# Patient Record
Sex: FEMALE | RURAL
[De-identification: names, ages, dates, MRNs, and addresses within clinical notes are randomized per-mention and may not be internally consistent; named-entity substitution may affect disease eponyms.]

---

## 2019-11-12 ENCOUNTER — HISTORICAL (OUTPATIENT)
Dept: ADMINISTRATIVE | Facility: HOSPITAL | Age: 24
End: 2019-11-12

## 2019-11-14 LAB
LAB AP CLINICAL INFORMATION: NORMAL
LAB AP DIAGNOSIS - HISTORICAL: NORMAL
LAB AP GROSS PATHOLOGY - HISTORICAL: NORMAL
LAB AP SPECIMEN SUBMITTED - HISTORICAL: NORMAL

## 2024-07-25 ENCOUNTER — OFFICE VISIT (OUTPATIENT)
Dept: OBSTETRICS AND GYNECOLOGY | Facility: CLINIC | Age: 29
End: 2024-07-25
Payer: COMMERCIAL

## 2024-07-25 VITALS
HEIGHT: 64 IN | SYSTOLIC BLOOD PRESSURE: 131 MMHG | WEIGHT: 194 LBS | DIASTOLIC BLOOD PRESSURE: 79 MMHG | HEART RATE: 73 BPM | TEMPERATURE: 98 F | RESPIRATION RATE: 18 BRPM | BODY MASS INDEX: 33.12 KG/M2 | OXYGEN SATURATION: 98 %

## 2024-07-25 DIAGNOSIS — N91.1 SECONDARY AMENORRHEA: Primary | ICD-10-CM

## 2024-07-25 PROCEDURE — 99204 OFFICE O/P NEW MOD 45 MIN: CPT | Mod: ,,, | Performed by: ADVANCED PRACTICE MIDWIFE

## 2024-07-25 PROCEDURE — 3008F BODY MASS INDEX DOCD: CPT | Mod: ,,, | Performed by: ADVANCED PRACTICE MIDWIFE

## 2024-07-25 PROCEDURE — 3075F SYST BP GE 130 - 139MM HG: CPT | Mod: ,,, | Performed by: ADVANCED PRACTICE MIDWIFE

## 2024-07-25 PROCEDURE — 3078F DIAST BP <80 MM HG: CPT | Mod: ,,, | Performed by: ADVANCED PRACTICE MIDWIFE

## 2024-07-25 PROCEDURE — 1159F MED LIST DOCD IN RCRD: CPT | Mod: ,,, | Performed by: ADVANCED PRACTICE MIDWIFE

## 2024-07-25 NOTE — PROGRESS NOTES
Missed Menses/ Possible Pregnancy  Patient complains of amenorrhea. She believes she could be pregnant. Pregnancy is desired. Sexual Activity: has sex with males. Current symptoms also include: breast tenderness, frequent urination, nausea, and positive home pregnancy test. Last period was normal.     Patient's last menstrual period was 2024.     Review of patient's allergies indicates:  No Known Allergies    History reviewed. No pertinent past medical history.  Past Surgical History:   Procedure Laterality Date    MOUTH SURGERY       OB History          2    Para   1    Term   1            AB        Living   1         SAB        IAB        Ectopic        Multiple        Live Births   1               Family History   Problem Relation Name Age of Onset    Diabetes Maternal Grandmother      Hypertension Father      Ovarian cancer Mother      Ovarian cysts Mother       Social History     Tobacco Use    Smoking status: Never     Passive exposure: Never    Smokeless tobacco: Never   Substance Use Topics    Alcohol use: Never     Genetic History reviewed    Current Outpatient Medications   Medication Sig    PNV no.95/ferrous fum/folic ac (PRENATAL ORAL) Take by mouth.     No current facility-administered medications for this visit.       OB History    Para Term  AB Living   2 1 1     1   SAB IAB Ectopic Multiple Live Births           1      # Outcome Date GA Lbr Bob/2nd Weight Sex Type Anes PTL Lv   2 Current            1 Term 19 40w0d  3.09 kg (6 lb 13 oz) M Vag-Spont EPI N CANDELARIO        Review of Systems  ROS:  GENERAL: No fever, chills, fatigability or weight loss.  VULVAR: No pain, no lesions and no itching.  VAGINAL: No relaxation, no itching, no discharge, no abnormal bleeding and no lesions.  ABDOMEN: No abdominal pain. Denies nausea. Denies vomiting. No diarrhea. No constipation  BREAST: Denies pain. No lumps. No discharge.  URINARY: No incontinence, no nocturia, no frequency  and no dysuria.  CARDIOVASCULAR: No chest pain. No shortness of breath. No leg cramps.  NEUROLOGICAL: no headaches. No vision changes.    Objective:     PE:  AFFECT: Calm, alert and oriented X 3. Interactive during exam  GENERAL: Appears well-nourished, well-developed, in no acute distress.  HEAD: Normocephalic, atruamatic  TEETH: Good dentition.  THYROID: No thyromegally   BREASTS: No masses, skin changes, nipple discharge or adenopathy bilaterally.  SKIN: Normal for race, warm, & dry. No lesions or rashes.  LUNGS: Easy and unlabored, clear to auscultation bilaterally.  HEART: Regular rate and rhythm   EXTREMITIES: No cyanosis, clubbing or edema. No calf tenderness.    Pregnancy test: positive  EDC: 3/3/2025     Assessment:     Emilie was seen today for confirm pregnancy.    Diagnoses and all orders for this visit:    Secondary amenorrhea  -     POCT urine pregnancy    BMI 33.0-33.9,adult        ICD-10-CM ICD-9-CM    1. Secondary amenorrhea  N91.1 626.0 POCT urine pregnancy      2. BMI 33.0-33.9,adult  Z68.33 V85.33            Plan:     Oriented to practice  Bleeding precautions discussed. If noted, follow up a the emergency room or clinic if scheduled for evaluation.  Counseled to avoid cat litter boxes.  Avoid gardening without gloves  Avoid half cooked, rare, or raw meats.  Avoid foods high in nitrites such as cold cuts- heat up any deli meats.  Seafood no more than 3 times per week or may eat large fish like tuna no more than once a week.  Avoid soft unpasteurized cheeses.  I recommend a PNV daily.    She should avoid ibuprofen, aleve, advil, BC powders.  OB bag with prenatal book and information provided  Weight gain in pregnancy and limit to 15 pounds in pregnancy discussed  Vitamin D daily  Prenatal vitamins daily  Verbalized understanding to all instructions and information  Questions answered to desired level of satisfaction      Follow up in about 1 week (around 8/1/2024), or if symptoms worsen or fail  to improve, for OB Annual Exam 1 wk with an ultrasound and f/u in 2 wks for NOB visit.    Lucia Hernandez, DNP, CNM, WHNP-BC

## 2024-07-25 NOTE — LETTER
July 25, 2024    Emilie Petty  [address] [place] [address] [place] [address]             [facility] - [facility]/GYN  [address] [place] [address]  Phone: [phone]  Fax: [phone] 07/25/2024     Emilie Petty   1995       Emilie Petty is currently pregnant and her due date is Estimated Date of Delivery: 3/3/25.    Sincerely,          Lucia Hernandez, MARGOT, CNM, WHNP-BC  Doctor of Nursing Practice, Certified Nurse Midwife, Women's Health Nurse Practitioner

## 2024-07-25 NOTE — LETTER
July 25, 2024    Emilie Petty  [address] [place] [address] [place] [address]             [facility] - OB/[facility]N  [address] [place] [address]  Phone: [phone]  Fax: [phone] 07/25/2024     Emiliemarcella Petty   1995       Emilie Petty is currently pregnant and her due date is Estimated Date of Delivery: 3/9/25.    Sincerely,          Dee Dee Powell LPN for Lucia Hernandez, DNP, CNM, NP-BC  Doctor of Nursing Practice, Certified Nurse Midwife, Women's Health Nurse Practitioner

## 2024-07-31 ENCOUNTER — OFFICE VISIT (OUTPATIENT)
Dept: OBSTETRICS AND GYNECOLOGY | Facility: CLINIC | Age: 29
End: 2024-07-31
Payer: COMMERCIAL

## 2024-07-31 ENCOUNTER — PROCEDURE VISIT (OUTPATIENT)
Dept: OBSTETRICS AND GYNECOLOGY | Facility: CLINIC | Age: 29
End: 2024-07-31
Payer: COMMERCIAL

## 2024-07-31 VITALS
DIASTOLIC BLOOD PRESSURE: 80 MMHG | WEIGHT: 191 LBS | RESPIRATION RATE: 18 BRPM | SYSTOLIC BLOOD PRESSURE: 129 MMHG | OXYGEN SATURATION: 99 % | HEART RATE: 79 BPM | TEMPERATURE: 98 F | BODY MASS INDEX: 32.61 KG/M2 | HEIGHT: 64 IN

## 2024-07-31 DIAGNOSIS — Z11.3 SCREEN FOR SEXUALLY TRANSMITTED DISEASES: ICD-10-CM

## 2024-07-31 DIAGNOSIS — Z01.419 WELL WOMAN EXAM WITH ROUTINE GYNECOLOGICAL EXAM: Primary | ICD-10-CM

## 2024-07-31 DIAGNOSIS — Z72.51 RISK FOR SEXUALLY TRANSMITTED DISEASE: ICD-10-CM

## 2024-07-31 DIAGNOSIS — Z12.4 ENCOUNTER FOR SCREENING FOR MALIGNANT NEOPLASM OF CERVIX: ICD-10-CM

## 2024-07-31 DIAGNOSIS — N91.1 SECONDARY AMENORRHEA: Primary | ICD-10-CM

## 2024-07-31 LAB
CANDIDA SPECIES: NEGATIVE
GARDNERELLA: POSITIVE
TRICHOMONAS: NEGATIVE

## 2024-07-31 PROCEDURE — 1159F MED LIST DOCD IN RCRD: CPT | Mod: ,,, | Performed by: ADVANCED PRACTICE MIDWIFE

## 2024-07-31 PROCEDURE — 3074F SYST BP LT 130 MM HG: CPT | Mod: ,,, | Performed by: ADVANCED PRACTICE MIDWIFE

## 2024-07-31 PROCEDURE — 3008F BODY MASS INDEX DOCD: CPT | Mod: ,,, | Performed by: ADVANCED PRACTICE MIDWIFE

## 2024-07-31 PROCEDURE — 87591 N.GONORRHOEAE DNA AMP PROB: CPT | Mod: ,,, | Performed by: CLINICAL MEDICAL LABORATORY

## 2024-07-31 PROCEDURE — 87624 HPV HI-RISK TYP POOLED RSLT: CPT | Mod: ,,, | Performed by: CLINICAL MEDICAL LABORATORY

## 2024-07-31 PROCEDURE — 87480 CANDIDA DNA DIR PROBE: CPT | Mod: ,,, | Performed by: CLINICAL MEDICAL LABORATORY

## 2024-07-31 PROCEDURE — 99402 PREV MED CNSL INDIV APPRX 30: CPT | Mod: ,,, | Performed by: ADVANCED PRACTICE MIDWIFE

## 2024-07-31 PROCEDURE — 76801 OB US < 14 WKS SINGLE FETUS: CPT | Mod: ,,, | Performed by: OBSTETRICS & GYNECOLOGY

## 2024-07-31 PROCEDURE — 87491 CHLMYD TRACH DNA AMP PROBE: CPT | Mod: ,,, | Performed by: CLINICAL MEDICAL LABORATORY

## 2024-07-31 PROCEDURE — 88142 CYTOPATH C/V THIN LAYER: CPT | Mod: TC,GCY | Performed by: ADVANCED PRACTICE MIDWIFE

## 2024-07-31 PROCEDURE — 87510 GARDNER VAG DNA DIR PROBE: CPT | Mod: ,,, | Performed by: CLINICAL MEDICAL LABORATORY

## 2024-07-31 PROCEDURE — 87660 TRICHOMONAS VAGIN DIR PROBE: CPT | Mod: ,,, | Performed by: CLINICAL MEDICAL LABORATORY

## 2024-07-31 PROCEDURE — 3079F DIAST BP 80-89 MM HG: CPT | Mod: ,,, | Performed by: ADVANCED PRACTICE MIDWIFE

## 2024-07-31 PROCEDURE — 99499 UNLISTED E&M SERVICE: CPT | Mod: ,,, | Performed by: OBSTETRICS & GYNECOLOGY

## 2024-07-31 NOTE — PROGRESS NOTES
"CC: Here for pap smear, annual exam    Emilie Petty is a 29 y.o. female  presents for well woman exam.  LMP: Patient's last menstrual period was 2024 (exact date).. Menses are: normal. Denies any further issues, problems, or complaints.    Last mammogram: n/a  Colonoscopy: n/a    History reviewed. No pertinent past medical history.  Past Surgical History:   Procedure Laterality Date    MOUTH SURGERY       Social History     Socioeconomic History    Marital status: Single   Tobacco Use    Smoking status: Never     Passive exposure: Never    Smokeless tobacco: Never   Substance and Sexual Activity    Alcohol use: Never    Sexual activity: Yes     Partners: Male     Family History   Problem Relation Name Age of Onset    Diabetes Maternal Grandmother      Hypertension Father      Ovarian cancer Mother      Ovarian cysts Mother       OB History          2    Para   1    Term   1            AB        Living   1         SAB        IAB        Ectopic        Multiple        Live Births   1                 /80   Pulse 79   Temp 98.3 °F (36.8 °C) (Oral)   Resp 18   Ht 5' 4" (1.626 m)   Wt 86.6 kg (191 lb)   LMP 2024 (Exact Date)   SpO2 99%   BMI 32.79 kg/m²       ROS:  GENERAL: Denies weight gain or weight loss. Feeling well overall.   SKIN: Denies rash or lesions.   HEAD: Denies head injury or headache.   NODES: Denies enlarged lymph nodes.   CHEST: Denies chest pain or shortness of breath.   CARDIOVASCULAR: Denies palpitations or left sided chest pain.   ABDOMEN: No abdominal pain, constipation, diarrhea, nausea, vomiting or rectal bleeding.   URINARY: No frequency, dysuria, hematuria, or burning on urination.  REPRODUCTIVE: See HPI.   BREASTS: The patient performs breast self-examination and denies pain, lumps, or nipple discharge.   HEMATOLOGIC: No easy bruisability or excessive bleeding.   MUSCULOSKELETAL: Denies joint pain or swelling.   NEUROLOGIC: Denies syncope or " weakness.   PSYCHIATRIC: Denies depression, anxiety or mood swings.    PHYSICAL EXAM:  APPEARANCE: Well nourished, well developed, in no acute distress.  AFFECT: WNL, alert and oriented x 3  SKIN: No acne or hirsutism  NECK: Neck symmetric without masses or thyromegaly  NODES: No inguinal, cervical, axillary, or femoral lymph node enlargement  CHEST: Good respiratory effect  ABDOMEN: Soft.  No tenderness or masses.  No hepatosplenomegaly.  No hernias.  BREASTS: Symmetrical, no skin changes or visible lesions.  No palpable masses, nipple discharge bilaterally.  PELVIC: Normal external genitalia without lesions.  Normal hair distribution.  Adequate perineal body, normal urethral meatus.  Vagina moist and well rugated with thin white  discharge.  Cervix pink, without lesions, tenderness with thin white discharge.  No significant cystocele or rectocele.  Bimanual exam shows uterus to be normal size, regular, mobile and nontender.  Adnexa without masses or tenderness.    EXTREMITIES: No edema.    Well woman exam with routine gynecological exam  -     ThinPrep Pap Test; Future; Expected date: 07/31/2024    Encounter for screening for malignant neoplasm of cervix  -     ThinPrep Pap Test; Future; Expected date: 07/31/2024    Screen for sexually transmitted diseases  -     Chlamydia/GC, PCR; Future; Expected date: 07/31/2024  -     Bacterial Vaginosis; Future; Expected date: 07/31/2024    Risk for sexually transmitted disease  -     Chlamydia/GC, PCR; Future; Expected date: 07/31/2024  -     Bacterial Vaginosis; Future; Expected date: 07/31/2024    BMI 32.0-32.9,adult        ICD-10-CM ICD-9-CM    1. Well woman exam with routine gynecological exam  Z01.419 V72.31 ThinPrep Pap Test      2. Encounter for screening for malignant neoplasm of cervix  Z12.4 V76.2 ThinPrep Pap Test      3. Screen for sexually transmitted diseases  Z11.3 V74.5 Chlamydia/GC, PCR      Bacterial Vaginosis      4. Risk for sexually transmitted disease   Z72.51 V69.2 Chlamydia/GC, PCR      Bacterial Vaginosis      5. BMI 32.0-32.9,adult  Z68.32 V85.32           Patient was counseled today on A.C.S. Pap guidelines and recommendations for yearly pelvic exams, mammograms and monthly self breast exams; to see her PCP for other health maintenance.   Exercise regimen encouraged  Healthy food choices encouraged  Multivitamins daily  Vitamin D daily  Questions answered to desired level of satisfaction  Verbalized understanding to all information and instructions    Follow up in about 1 year (around 7/31/2025), or if symptoms worsen or fail to improve, for annual exam.

## 2024-08-01 LAB
CHLAMYDIA BY PCR: NEGATIVE
N. GONORRHOEAE (GC) BY PCR: NEGATIVE

## 2024-08-02 LAB
GH SERPL-MCNC: NORMAL NG/ML
INSULIN SERPL-ACNC: NORMAL U[IU]/ML
LAB AP CLINICAL INFORMATION: NORMAL
LAB AP GYN INTERPRETATION: NEGATIVE
LAB AP PAP DISCLAIMER COMMENTS: NORMAL
RENIN PLAS-CCNC: NORMAL NG/ML/H

## 2024-08-05 DIAGNOSIS — N76.0 BV (BACTERIAL VAGINOSIS): Primary | ICD-10-CM

## 2024-08-05 DIAGNOSIS — B96.89 BV (BACTERIAL VAGINOSIS): Primary | ICD-10-CM

## 2024-08-05 RX ORDER — METRONIDAZOLE 500 MG/1
500 TABLET ORAL 2 TIMES DAILY
Qty: 14 TABLET | Refills: 0 | Status: SHIPPED | OUTPATIENT
Start: 2024-08-05 | End: 2024-08-12

## 2024-08-08 ENCOUNTER — ROUTINE PRENATAL (OUTPATIENT)
Dept: OBSTETRICS AND GYNECOLOGY | Facility: CLINIC | Age: 29
End: 2024-08-08
Payer: COMMERCIAL

## 2024-08-08 VITALS
BODY MASS INDEX: 32.68 KG/M2 | SYSTOLIC BLOOD PRESSURE: 121 MMHG | DIASTOLIC BLOOD PRESSURE: 77 MMHG | HEART RATE: 87 BPM | WEIGHT: 190.38 LBS

## 2024-08-08 DIAGNOSIS — Z36.89 ENCOUNTER FOR OTHER SPECIFIED ANTENATAL SCREENING: ICD-10-CM

## 2024-08-08 DIAGNOSIS — Z11.4 SCREENING FOR HIV (HUMAN IMMUNODEFICIENCY VIRUS): ICD-10-CM

## 2024-08-08 DIAGNOSIS — Z11.3 SCREEN FOR SEXUALLY TRANSMITTED DISEASES: ICD-10-CM

## 2024-08-08 DIAGNOSIS — Z34.80 ENCOUNTER FOR SUPERVISION OF OTHER NORMAL PREGNANCY, UNSPECIFIED TRIMESTER: Primary | ICD-10-CM

## 2024-08-08 DIAGNOSIS — Z3A.10 10 WEEKS GESTATION OF PREGNANCY: ICD-10-CM

## 2024-08-08 DIAGNOSIS — E55.9 VITAMIN D DEFICIENCY, UNSPECIFIED: ICD-10-CM

## 2024-08-08 DIAGNOSIS — Z72.51 HIGH RISK HETEROSEXUAL BEHAVIOR: ICD-10-CM

## 2024-08-08 LAB
25(OH)D3 SERPL-MCNC: 37.2 NG/ML
BASOPHILS # BLD AUTO: 0.03 K/UL (ref 0–0.2)
BASOPHILS NFR BLD AUTO: 0.5 % (ref 0–1)
BILIRUB SERPL-MCNC: NORMAL MG/DL
BLOOD, POC UA: NORMAL
DIFFERENTIAL METHOD BLD: ABNORMAL
EOSINOPHIL # BLD AUTO: 0.07 K/UL (ref 0–0.5)
EOSINOPHIL NFR BLD AUTO: 1.1 % (ref 1–4)
ERYTHROCYTE [DISTWIDTH] IN BLOOD BY AUTOMATED COUNT: 14.8 % (ref 11.5–14.5)
GLUCOSE UR QL STRIP: NORMAL
HBV SURFACE AG SERPL QL IA: NORMAL
HCT VFR BLD AUTO: 36.9 % (ref 38–47)
HGB BLD-MCNC: 11.4 G/DL (ref 12–16)
HIV 1+O+2 AB SERPL QL: NORMAL
IMM GRANULOCYTES # BLD AUTO: 0.02 K/UL (ref 0–0.04)
IMM GRANULOCYTES NFR BLD: 0.3 % (ref 0–0.4)
KETONES UR QL STRIP: NORMAL
LEUKOCYTE ESTERASE URINE, POC: NORMAL
LYMPHOCYTES # BLD AUTO: 2.13 K/UL (ref 1–4.8)
LYMPHOCYTES NFR BLD AUTO: 32.1 % (ref 27–41)
MCH RBC QN AUTO: 24.9 PG (ref 27–31)
MCHC RBC AUTO-ENTMCNC: 30.9 G/DL (ref 32–36)
MCV RBC AUTO: 80.6 FL (ref 80–96)
MONOCYTES # BLD AUTO: 0.7 K/UL (ref 0–0.8)
MONOCYTES NFR BLD AUTO: 10.6 % (ref 2–6)
MPC BLD CALC-MCNC: 11.2 FL (ref 9.4–12.4)
NEUTROPHILS # BLD AUTO: 3.68 K/UL (ref 1.8–7.7)
NEUTROPHILS NFR BLD AUTO: 55.4 % (ref 53–65)
NITRITE, POC UA: NORMAL
NRBC # BLD AUTO: 0 X10E3/UL
NRBC, AUTO (.00): 0 %
PH, POC UA: 7
PLATELET # BLD AUTO: 271 K/UL (ref 150–400)
PROTEIN, POC: NORMAL
RBC # BLD AUTO: 4.58 M/UL (ref 4.2–5.4)
RUBV IGG SER-ACNC: NORMAL [IU]/ML
SPECIFIC GRAVITY, POC UA: >=1.03
SYPHILIS AB INTERPRETATION: NORMAL
UROBILINOGEN, POC UA: 1
WBC # BLD AUTO: 6.63 K/UL (ref 4.5–11)

## 2024-08-08 PROCEDURE — 36415 COLL VENOUS BLD VENIPUNCTURE: CPT | Mod: ,,, | Performed by: ADVANCED PRACTICE MIDWIFE

## 2024-08-08 PROCEDURE — 85025 COMPLETE CBC W/AUTO DIFF WBC: CPT | Mod: ,,, | Performed by: CLINICAL MEDICAL LABORATORY

## 2024-08-08 PROCEDURE — 87340 HEPATITIS B SURFACE AG IA: CPT | Mod: ,,, | Performed by: CLINICAL MEDICAL LABORATORY

## 2024-08-08 PROCEDURE — 87389 HIV-1 AG W/HIV-1&-2 AB AG IA: CPT | Mod: ,,, | Performed by: CLINICAL MEDICAL LABORATORY

## 2024-08-08 PROCEDURE — 87086 URINE CULTURE/COLONY COUNT: CPT | Mod: ,,, | Performed by: CLINICAL MEDICAL LABORATORY

## 2024-08-08 PROCEDURE — 83036 HEMOGLOBIN GLYCOSYLATED A1C: CPT | Mod: ,,, | Performed by: CLINICAL MEDICAL LABORATORY

## 2024-08-08 PROCEDURE — 82306 VITAMIN D 25 HYDROXY: CPT | Mod: ,,, | Performed by: CLINICAL MEDICAL LABORATORY

## 2024-08-08 PROCEDURE — 86762 RUBELLA ANTIBODY: CPT | Mod: ,,, | Performed by: CLINICAL MEDICAL LABORATORY

## 2024-08-08 PROCEDURE — 86780 TREPONEMA PALLIDUM: CPT | Mod: ,,, | Performed by: CLINICAL MEDICAL LABORATORY

## 2024-08-09 LAB
EST. AVERAGE GLUCOSE BLD GHB EST-MCNC: 108 MG/DL
HBA1C MFR BLD HPLC: 5.4 % (ref 4.5–6.6)
INDIRECT COOMBS: NORMAL
RH BLD: NORMAL
SPECIMEN OUTDATE: NORMAL

## 2024-08-10 LAB — UA COMPLETE W REFLEX CULTURE PNL UR: NO GROWTH

## 2024-08-15 ENCOUNTER — TELEPHONE (OUTPATIENT)
Dept: OBSTETRICS AND GYNECOLOGY | Facility: CLINIC | Age: 29
End: 2024-08-15
Payer: COMMERCIAL

## 2024-08-15 NOTE — TELEPHONE ENCOUNTER
Patient called the clinic crying and upset because she worked in another area and she was bending constantly all day and she was upset. She wants her [facility] paperwork to state that she covered for episodes. I asked what episode was she having and she said that she is having back pains and she needs to take off when she is having an episode. I'm unsure of what episode she is having but I never got a good understanding to what was. After explaining how I feel out the paper work and we a lot 3 days for prenatal visits a month. She still didn't understand. So, I told her when she come in for her next appointment talk to Lucia about it.

## 2024-08-28 NOTE — PROCEDURES
New OB Ultrasound note:      Uterus 11.78 x 7.78 x 7.68 cm    Crown-rump length 9 weeks 6 day  Fetal heart rate 161 beats per minute     Impression:    IUP with fetal heart tones   Estimated gestational age 9 weeks 6 day  Estimated delivery date February 27, 2025

## 2024-09-05 ENCOUNTER — ROUTINE PRENATAL (OUTPATIENT)
Dept: OBSTETRICS AND GYNECOLOGY | Facility: CLINIC | Age: 29
End: 2024-09-05
Payer: COMMERCIAL

## 2024-09-05 VITALS
BODY MASS INDEX: 32.58 KG/M2 | HEART RATE: 67 BPM | SYSTOLIC BLOOD PRESSURE: 133 MMHG | WEIGHT: 189.81 LBS | DIASTOLIC BLOOD PRESSURE: 77 MMHG

## 2024-09-05 DIAGNOSIS — Z3A.14 14 WEEKS GESTATION OF PREGNANCY: Primary | ICD-10-CM

## 2024-09-05 LAB
BILIRUB SERPL-MCNC: NEGATIVE MG/DL
BLOOD, POC UA: NEGATIVE
GLUCOSE UR QL STRIP: NEGATIVE
KETONES UR QL STRIP: NEGATIVE
LEUKOCYTE ESTERASE URINE, POC: NEGATIVE
NITRITE, POC UA: NEGATIVE
PH, POC UA: 7.5
PROTEIN, POC: NEGATIVE
SPECIFIC GRAVITY, POC UA: 1.02
UROBILINOGEN, POC UA: 1

## 2024-09-05 NOTE — PROGRESS NOTES
29 y.o. female  at 14w3d   She c/o sciatic nerve discomforts  Reports occasional fetal movement or fluttering. Denies any vaginal bleeding, leakage of fluid, cramping, contractions, or pressure.   Total weight gain/weight loss in pregnancy: -1.905 kg (-4 lb 3.2 oz)     Vitals  BP: 133/77  Pulse: 67  Weight: 86.1 kg (189 lb 12.8 oz)  Prenatal  Fundal Height (cm): 15 cm  Fetal Heart Rate: 161  Movement: Present  Urine Albumin/Glucose  Urine Albumin: Negative  Urine Glucose: Negative  Edema  LLE Edema: None  RLE Edema: None  Facial: None  Additional Edema?: No    Prenatal Labs:  Lab Results   Component Value Date    GROUPTRH A NEG 2024    HGB 11.4 (L) 2024    HCT 36.9 (L) 2024     2024    SICKLE Negative 2024    HEPBSAG Non-Reactive 2024    [id number] Non-Reactive 2024    LABNGO Negative 2024    LABURIN No Growth 2024       A: 14w3d           ICD-10-CM ICD-9-CM    1. 14 weeks gestation of pregnancy  Z3A.14 V22.2 POCT URINALYSIS          P: Bleeding, daily fetal kick counts, and  labor/labor precautions discussed.    The following were addressed during this visit:    13-16 Weeks  - Quad screen   - Anatomy Ultrasound   - Breastfeeding Concerns & Resources   - Importance of Early Skin to Skin Contact     Stretches discussed for sciatic nerve discomfort  Tylenol ES prn  Questions answered to desired level of satisfaction  Verbalized understanding to all information and instructions provided.  Follow up in about 4 weeks (around 10/3/2024).    Lucia Hernandez DNP, CNM, WHNP-BC

## 2024-10-03 ENCOUNTER — ROUTINE PRENATAL (OUTPATIENT)
Dept: OBSTETRICS AND GYNECOLOGY | Facility: CLINIC | Age: 29
End: 2024-10-03
Payer: COMMERCIAL

## 2024-10-03 VITALS
WEIGHT: 190.81 LBS | SYSTOLIC BLOOD PRESSURE: 122 MMHG | BODY MASS INDEX: 32.75 KG/M2 | DIASTOLIC BLOOD PRESSURE: 72 MMHG | HEART RATE: 73 BPM

## 2024-10-03 DIAGNOSIS — N89.8 VAGINAL ODOR: ICD-10-CM

## 2024-10-03 DIAGNOSIS — Z36.89 SCREENING, ANTENATAL, FOR FETAL ANATOMIC SURVEY: ICD-10-CM

## 2024-10-03 DIAGNOSIS — Z3A.18 18 WEEKS GESTATION OF PREGNANCY: Primary | ICD-10-CM

## 2024-10-03 LAB
BILIRUB SERPL-MCNC: NEGATIVE MG/DL
BLOOD, POC UA: NEGATIVE
CANDIDA SPECIES: POSITIVE
GARDNERELLA: POSITIVE
GLUCOSE UR QL STRIP: NEGATIVE
KETONES UR QL STRIP: NEGATIVE
LEUKOCYTE ESTERASE URINE, POC: NORMAL
NITRITE, POC UA: NEGATIVE
PH, POC UA: 6.5
PROTEIN, POC: NEGATIVE
SPECIFIC GRAVITY, POC UA: 1.02
TRICHOMONAS: NEGATIVE
UROBILINOGEN, POC UA: 0.2

## 2024-10-03 PROCEDURE — 87510 GARDNER VAG DNA DIR PROBE: CPT | Mod: ,,, | Performed by: CLINICAL MEDICAL LABORATORY

## 2024-10-03 PROCEDURE — 87660 TRICHOMONAS VAGIN DIR PROBE: CPT | Mod: ,,, | Performed by: CLINICAL MEDICAL LABORATORY

## 2024-10-03 PROCEDURE — 87480 CANDIDA DNA DIR PROBE: CPT | Mod: ,,, | Performed by: CLINICAL MEDICAL LABORATORY

## 2024-10-03 NOTE — PATIENT INSTRUCTIONS
"CDC.GOV     Fact Sheet About Bacterial Vaginosis (BV)    Key points    Bacterial vaginosis (BV) is a common, treatable, vaginal condition      What is bacterial vaginosis (BV)?  Bacterial vaginosis (BV) is a condition that happens when there is too much of certain bacteria in the vagina, causing an imbalance.    Signs and symptoms  How common is BV?  BV is the most common vaginal condition in women ages 15-44.  Douching, not using condoms, and having new or multiple sex partners can upset the normal balance of vaginal bacteria, increasing your risk for getting BV.    How it spreads  How does BV spread?  Researchers do not know the cause of BV. However, we do know the condition most often occurs in those who are sexually active. BV is a result of an imbalance of "good" and "harmful" bacteria in a vagina. Douching, not using condoms, and having new or multiple sex partners can upset the normal balance of vaginal bacteria, increasing your risk for getting BV.  We also do not know how sex causes BV. There also is no research to show that treating a sex partner affects whether someone gets BV. Having BV can increase your chances of getting other STDs.  Douching, not using condoms, and having new or multiple sex partners can upset the normal balance of vaginal bacteria, increasing your risk for getting BV.  BV rarely affects those who have never had sex.  You cannot get BV from toilet seats, bedding, or swimming pools.    Prevention  How can I avoid getting BV?  Healthcare providers and scientists do not completely understand how BV spreads or know how best to prevent it.  The following basic prevention steps may help lower your risk of getting BV:  Not having sex  Limiting your number of sex partners  Not douching  Using condoms the right way every time you have sex    Im pregnant. How does BV affect my baby?  Treating BV during pregnancy is very important. If you are pregnant and have BV, your baby is more likely to be " born early (premature) or at a low birth weight. Low birth weight means having a baby that weighs less than 5.5 pounds at birth.    Treatment and recovery  How do I know if I have BV?  Many people with BV do not have symptoms. If you do have symptoms, you may notice:  A thin white or gray vaginal discharge  Pain, itching, or burning in the vagina  A strong fish-like odor, especially after sex  Burning when peeing  Itching around the outside of the vagina  How will my healthcare provider know if I have BV?  A healthcare provider will examine your vagina for signs of discharge. They also can test a sample of vaginal fluid to determine if BV is present.  Is there a cure for BV?  A healthcare provider can treat BV with antibiotics. If you have symptoms, you should be checked and treated by a healthcare provider. It is important to take all the medicine your provider prescribes, even if your symptoms go away. Treatment also may reduce the risk for getting other STDs. BV can return even after treatment.  Male sex partners of women with BV do not need treatment. However, BV can spread between female sex partners.  What happens if I don't receive treatment?  At times, BV will go away without treatment. However, treatment can help avoid the increased chance of some serious health risks associated with BV, including:  Getting or transmitting HIV  Delivering your baby too early if you have BV while pregnant and  Getting other STDs like chlamydia and gonorrhea. These bacteria can cause pelvic inflammatory disease (PID), which can make it difficult for you to have children    Resources  NIRMALA Sena, BETHANY Dumont, NEMO Peguero, et. al. Sexually Transmitted Infections Treatment Guidelines, 2021. MMWR Recomm Rep 2021; 70(No. 4): 1-187.  Esthela ROSA and Severino MARTINEZ. Bacterial vaginosis. In: MILTON Keyes P. Mardh et al (eds). Sexually Transmitted Diseases, 3rd Edition. [place]: [employer], 1999, 563-586.

## 2024-10-03 NOTE — PROGRESS NOTES
29 y.o. female  at 18w3d   She c/o vaginal odor change and requests to be checked for bacterial vaginosis  Reports good fetal movement or fluttering. Denies any vaginal bleeding, leakage of fluid, cramping, contractions, or pressure.   Total weight gain/weight loss in pregnancy: -1.452 kg (-3 lb 3.2 oz)     Vitals  BP: 122/72  Pulse: 73  Weight: 86.5 kg (190 lb 12.8 oz)  Prenatal  Fundal Height (cm): 19 cm  Fetal Heart Rate: 154  Movement: Present  Urine Albumin/Glucose  Urine Albumin: Negative  Urine Glucose: Negative  Edema  LLE Edema: None  RLE Edema: None  Facial: None  Additional Edema?: No    Prenatal Labs:  Lab Results   Component Value Date    GROUPTRH A NEG 2024    HGB 11.4 (L) 2024    HCT 36.9 (L) 2024     2024    SICKLE Negative 2024    HEPBSAG Non-Reactive 2024    [id number] Non-Reactive 2024    LABNGO Negative 2024    LABURIN No Growth 2024       A: 18w3d           ICD-10-CM ICD-9-CM    1. 18 weeks gestation of pregnancy  Z3A.18 V22.2 POCT URINALYSIS      2. Screening, , for fetal anatomic survey  Z36.89 V28.81 US OB 14+ Wks, TransAbd, Single Gestation      3. Vaginal odor  N89.8 625.8 Bacterial Vaginosis      Bacterial Vaginosis          P: Bleeding, daily fetal kick counts, and  labor/labor precautions discussed.    No pregnancy checklist tasks were completed during this visit, and no tasks are pending completion.    US with Dr. Cooley for anatomy scan scheduled for 10/24/2024 @ 9 am  Questions answered to desired level of satisfaction  Verbalized understanding to all information and instructions provided.  Follow up in about 4 weeks (around 10/31/2024), or if symptoms worsen or fail to improve, for JACQUELINE Hernandez, DNP, CNM, WHNP-BC

## 2024-10-09 DIAGNOSIS — B96.89 BV (BACTERIAL VAGINOSIS): Primary | ICD-10-CM

## 2024-10-09 DIAGNOSIS — N76.0 BV (BACTERIAL VAGINOSIS): Primary | ICD-10-CM

## 2024-10-09 DIAGNOSIS — B37.31 VAGINAL CANDIDA: ICD-10-CM

## 2024-10-09 RX ORDER — ASPIRIN 81 MG/1
81 TABLET ORAL DAILY
COMMUNITY
Start: 2024-10-09 | End: 2025-07-16

## 2024-10-09 RX ORDER — TERCONAZOLE 4 MG/G
1 CREAM VAGINAL NIGHTLY
Qty: 45 G | Refills: 0 | Status: SHIPPED | OUTPATIENT
Start: 2024-10-09 | End: 2024-10-14

## 2024-10-09 RX ORDER — METRONIDAZOLE 500 MG/1
500 TABLET ORAL 2 TIMES DAILY
Qty: 14 TABLET | Refills: 0 | Status: SHIPPED | OUTPATIENT
Start: 2024-10-09 | End: 2024-10-16

## 2024-10-24 ENCOUNTER — HOSPITAL ENCOUNTER (OUTPATIENT)
Dept: RADIOLOGY | Facility: HOSPITAL | Age: 29
Discharge: HOME | End: 2024-10-24
Attending: ADVANCED PRACTICE MIDWIFE
Payer: COMMERCIAL

## 2024-10-24 ENCOUNTER — PATIENT MESSAGE (OUTPATIENT)
Dept: OBSTETRICS AND GYNECOLOGY | Facility: CLINIC | Age: 29
End: 2024-10-24
Payer: COMMERCIAL

## 2024-10-24 DIAGNOSIS — Z36.89 SCREENING, ANTENATAL, FOR FETAL ANATOMIC SURVEY: ICD-10-CM

## 2024-10-24 PROCEDURE — 76805 OB US >/= 14 WKS SNGL FETUS: CPT | Mod: TC

## 2024-10-31 ENCOUNTER — ROUTINE PRENATAL (OUTPATIENT)
Dept: OBSTETRICS AND GYNECOLOGY | Facility: CLINIC | Age: 29
End: 2024-10-31
Payer: COMMERCIAL

## 2024-10-31 VITALS
DIASTOLIC BLOOD PRESSURE: 73 MMHG | HEART RATE: 73 BPM | BODY MASS INDEX: 33.71 KG/M2 | WEIGHT: 196.38 LBS | SYSTOLIC BLOOD PRESSURE: 126 MMHG

## 2024-10-31 DIAGNOSIS — O26.899 RH NEGATIVE STATE IN ANTEPARTUM PERIOD: ICD-10-CM

## 2024-10-31 DIAGNOSIS — Z3A.23 23 WEEKS GESTATION OF PREGNANCY: Primary | ICD-10-CM

## 2024-10-31 DIAGNOSIS — Z67.91 RH NEGATIVE STATE IN ANTEPARTUM PERIOD: ICD-10-CM

## 2024-10-31 LAB
BILIRUB SERPL-MCNC: NEGATIVE MG/DL
BLOOD, POC UA: NEGATIVE
GLUCOSE UR QL STRIP: NEGATIVE
KETONES UR QL STRIP: NEGATIVE
LEUKOCYTE ESTERASE URINE, POC: NEGATIVE
NITRITE, POC UA: NEGATIVE
PH, POC UA: 7
PROTEIN, POC: NEGATIVE
SPECIFIC GRAVITY, POC UA: 1.02
UROBILINOGEN, POC UA: 0.2

## 2024-12-02 ENCOUNTER — INFUSION (OUTPATIENT)
Dept: INFUSION THERAPY | Facility: HOSPITAL | Age: 29
End: 2024-12-02
Attending: ADVANCED PRACTICE MIDWIFE
Payer: MEDICAID

## 2024-12-02 ENCOUNTER — ROUTINE PRENATAL (OUTPATIENT)
Dept: OBSTETRICS AND GYNECOLOGY | Facility: CLINIC | Age: 29
End: 2024-12-02
Payer: MEDICAID

## 2024-12-02 VITALS
SYSTOLIC BLOOD PRESSURE: 110 MMHG | DIASTOLIC BLOOD PRESSURE: 68 MMHG | HEART RATE: 71 BPM | WEIGHT: 198 LBS | BODY MASS INDEX: 33.99 KG/M2

## 2024-12-02 DIAGNOSIS — O26.899 RH NEGATIVE STATE IN ANTEPARTUM PERIOD: Primary | ICD-10-CM

## 2024-12-02 DIAGNOSIS — Z67.91 RH NEGATIVE STATE IN ANTEPARTUM PERIOD: Primary | ICD-10-CM

## 2024-12-02 DIAGNOSIS — Z3A.27 27 WEEKS GESTATION OF PREGNANCY: Primary | ICD-10-CM

## 2024-12-02 DIAGNOSIS — Z67.91 RH NEGATIVE STATE IN ANTEPARTUM PERIOD: ICD-10-CM

## 2024-12-02 DIAGNOSIS — O26.899 RH NEGATIVE STATE IN ANTEPARTUM PERIOD: ICD-10-CM

## 2024-12-02 LAB
AMPHET UR QL SCN: NEGATIVE
BARBITURATES UR QL SCN: NEGATIVE
BASOPHILS # BLD AUTO: 0.04 K/UL (ref 0–0.2)
BASOPHILS NFR BLD AUTO: 0.6 % (ref 0–1)
BENZODIAZ METAB UR QL SCN: NEGATIVE
BILIRUB SERPL-MCNC: NEGATIVE MG/DL
BLOOD, POC UA: NEGATIVE
CANNABINOIDS UR QL SCN: NEGATIVE
COCAINE UR QL SCN: NEGATIVE
DIFFERENTIAL METHOD BLD: ABNORMAL
EOSINOPHIL # BLD AUTO: 0.06 K/UL (ref 0–0.5)
EOSINOPHIL NFR BLD AUTO: 0.8 % (ref 1–4)
ERYTHROCYTE [DISTWIDTH] IN BLOOD BY AUTOMATED COUNT: 13.7 % (ref 11.5–14.5)
GLUCOSE SERPL-MCNC: 79 MG/DL (ref 70–100)
GLUCOSE UR QL STRIP: NEGATIVE
HCT VFR BLD AUTO: 31.9 % (ref 38–47)
HGB BLD-MCNC: 9.7 G/DL (ref 12–16)
IMM GRANULOCYTES # BLD AUTO: 0.05 K/UL (ref 0–0.04)
IMM GRANULOCYTES NFR BLD: 0.7 % (ref 0–0.4)
INDIRECT COOMBS: NORMAL
KETONES UR QL STRIP: NEGATIVE
LEUKOCYTE ESTERASE URINE, POC: NORMAL
LYMPHOCYTES # BLD AUTO: 2.15 K/UL (ref 1–4.8)
LYMPHOCYTES NFR BLD AUTO: 29.9 % (ref 27–41)
MCH RBC QN AUTO: 25.3 PG (ref 27–31)
MCHC RBC AUTO-ENTMCNC: 30.4 G/DL (ref 32–36)
MCV RBC AUTO: 83.3 FL (ref 80–96)
MONOCYTES # BLD AUTO: 0.71 K/UL (ref 0–0.8)
MONOCYTES NFR BLD AUTO: 9.9 % (ref 2–6)
MPC BLD CALC-MCNC: 11.7 FL (ref 9.4–12.4)
NEUTROPHILS # BLD AUTO: 4.19 K/UL (ref 1.8–7.7)
NEUTROPHILS NFR BLD AUTO: 58.1 % (ref 53–65)
NITRITE, POC UA: NEGATIVE
NRBC # BLD AUTO: 0 X10E3/UL
NRBC, AUTO (.00): 0 %
OPIATES UR QL SCN: NEGATIVE
PCP UR QL SCN: NEGATIVE
PH, POC UA: 7
PLATELET # BLD AUTO: 215 K/UL (ref 150–400)
PROTEIN, POC: NEGATIVE
RBC # BLD AUTO: 3.83 M/UL (ref 4.2–5.4)
RH BLD: NORMAL
RH IMMUNE GLOBULIN: NORMAL
SPECIFIC GRAVITY, POC UA: 1.02
SPECIMEN OUTDATE: NORMAL
UROBILINOGEN, POC UA: 1
WBC # BLD AUTO: 7.2 K/UL (ref 4.5–11)

## 2024-12-02 PROCEDURE — 80307 DRUG TEST PRSMV CHEM ANLYZR: CPT | Mod: ,,, | Performed by: CLINICAL MEDICAL LABORATORY

## 2024-12-02 PROCEDURE — 86850 RBC ANTIBODY SCREEN: CPT | Performed by: ADVANCED PRACTICE MIDWIFE

## 2024-12-02 PROCEDURE — 63600519 RHOGAM PHARM REV CODE 636 ALT 250 W HCPCS: Performed by: ADVANCED PRACTICE MIDWIFE

## 2024-12-02 PROCEDURE — 36415 COLL VENOUS BLD VENIPUNCTURE: CPT | Mod: ,,, | Performed by: ADVANCED PRACTICE MIDWIFE

## 2024-12-02 PROCEDURE — 59426 ANTEPARTUM CARE ONLY: CPT | Mod: TH,,, | Performed by: ADVANCED PRACTICE MIDWIFE

## 2024-12-02 PROCEDURE — 81003 URINALYSIS AUTO W/O SCOPE: CPT | Mod: QW,,, | Performed by: ADVANCED PRACTICE MIDWIFE

## 2024-12-02 PROCEDURE — 82947 ASSAY GLUCOSE BLOOD QUANT: CPT | Mod: ,,, | Performed by: CLINICAL MEDICAL LABORATORY

## 2024-12-02 PROCEDURE — 96372 THER/PROPH/DIAG INJ SC/IM: CPT

## 2024-12-02 PROCEDURE — 85025 COMPLETE CBC W/AUTO DIFF WBC: CPT | Mod: ,,, | Performed by: CLINICAL MEDICAL LABORATORY

## 2024-12-02 RX ADMIN — HUMAN RHO(D) IMMUNE GLOBULIN 300 MCG: 1500 SOLUTION INTRAMUSCULAR; INTRAVENOUS at 09:12

## 2024-12-02 NOTE — PROGRESS NOTES
0800 pt here for Rhogam, resting in recliner, blood bank notified, type and screen drawn and sent to lab  0950 Rhogam given IM in the left deltoid, tolerated well, will watch for 15 min  1005 pt discharged ambulatory with no adverse reaction noted, pt notified to go to ER with any adverse reactions, AVS given along with medication information

## 2024-12-02 NOTE — PROGRESS NOTES
29 y.o. female  at 27w4d   She c/o some pressure every now and then when urinating, not consistent.  Reports good fetal movement or fluttering. Denies any vaginal bleeding, leakage of fluid, cramping, contractions, or pressure.   Total weight gain/weight loss in pregnancy: 1.814 kg (4 lb)     Vitals  BP: 110/68  Pulse: 71  Weight: 89.8 kg (198 lb)  Prenatal  Fundal Height (cm): 29 cm  Fetal Heart Rate: 159  Movement: Present  Urine Albumin/Glucose  Urine Albumin: Negative  Urine Glucose: Negative  Edema  LLE Edema: None  RLE Edema: None  Facial: None  Additional Edema?: No    Prenatal Labs:  Lab Results   Component Value Date    GROUPTRH A NEG 2024    HGB 11.4 (L) 2024    HCT 36.9 (L) 2024     2024    SICKLE Negative 2024    HEPBSAG Non-Reactive 2024    [id number] Non-Reactive 2024    LABNGO Negative 2024    LABURIN No Growth 2024       A: 27w4d           ICD-10-CM ICD-9-CM    1. 27 weeks gestation of pregnancy  Z3A.27 V22.2 Glucose Tolerance, 1 Hour      CBC Auto Differential      Drug Screen, Urine      POCT URINALYSIS      CANCELED: POCT Urinalysis      2. Rh negative state in antepartum period  O26.899 646.83     Z67.91            P: Bleeding, daily fetal kick counts, and  labor/labor precautions discussed.    The following were addressed during this visit:    25-28 Weeks  -  Labor Signs   - Childbirth Education   - Maternity Leave paperwork   - Smoking Intervention   - Weight Gain/Diet/Exercise   - Rhogam Given   - Rooming in baby during your hospital stay       Questions answered to desired level of satisfaction  Verbalized understanding to all information and instructions provided.  Follow up in about 2 weeks (around 2024), or if symptoms worsen or fail to improve, for JACQUELINE Hernandez, DNP, CNM, WHNP-BC

## 2024-12-03 DIAGNOSIS — D64.9 ANEMIA, UNSPECIFIED TYPE: Primary | ICD-10-CM

## 2024-12-03 RX ORDER — FERROUS SULFATE 325(65) MG
325 TABLET, DELAYED RELEASE (ENTERIC COATED) ORAL 2 TIMES DAILY
Qty: 60 TABLET | Refills: 4 | Status: SHIPPED | OUTPATIENT
Start: 2024-12-03

## 2024-12-16 ENCOUNTER — ROUTINE PRENATAL (OUTPATIENT)
Dept: OBSTETRICS AND GYNECOLOGY | Facility: CLINIC | Age: 29
End: 2024-12-16
Payer: MEDICAID

## 2024-12-16 VITALS
DIASTOLIC BLOOD PRESSURE: 74 MMHG | BODY MASS INDEX: 34.16 KG/M2 | WEIGHT: 199 LBS | SYSTOLIC BLOOD PRESSURE: 112 MMHG | HEART RATE: 85 BPM

## 2024-12-16 DIAGNOSIS — Z3A.29 29 WEEKS GESTATION OF PREGNANCY: Primary | ICD-10-CM

## 2024-12-16 DIAGNOSIS — D64.9 ANEMIA, UNSPECIFIED TYPE: ICD-10-CM

## 2024-12-16 LAB
BILIRUB SERPL-MCNC: NEGATIVE MG/DL
BLOOD, POC UA: NEGATIVE
GLUCOSE UR QL STRIP: NEGATIVE
KETONES UR QL STRIP: NEGATIVE
LEUKOCYTE ESTERASE URINE, POC: NEGATIVE
NITRITE, POC UA: NEGATIVE
PH, POC UA: 6
PROTEIN, POC: NEGATIVE
SPECIFIC GRAVITY, POC UA: 1.02
UROBILINOGEN, POC UA: 0.2

## 2024-12-16 PROCEDURE — 59425 ANTEPARTUM CARE ONLY: CPT | Mod: TH,,, | Performed by: ADVANCED PRACTICE MIDWIFE

## 2024-12-16 NOTE — PROGRESS NOTES
29 y.o. female  at 29w4d   She c/o pressure- refuses vaginal exam today  Reports good fetal movement or fluttering. Denies any vaginal bleeding, leakage of fluid, cramping, contractions, or pressure.   Total weight gain/weight loss in pregnancy: 2.268 kg (5 lb)     Vitals  BP: 112/74  Pulse: 85  Weight: 90.3 kg (199 lb)  Prenatal  Fundal Height (cm): 30 cm  Fetal Heart Rate: 152  Movement: Present  Urine Albumin/Glucose  Urine Albumin: Negative  Urine Glucose: Negative  Edema  LLE Edema: None  RLE Edema: None  Facial: None  Additional Edema?: No    Prenatal Labs:  Lab Results   Component Value Date    GROUPTRH A NEG 2024    HGB 9.7 (L) 2024    HCT 31.9 (L) 2024     2024    SICKLE Negative 2024    HEPBSAG Non-Reactive 2024    [id number] Non-Reactive 2024    LABNGO Negative 2024    LABURIN No Growth 2024       A: 29w4d           ICD-10-CM ICD-9-CM    1. 29 weeks gestation of pregnancy  Z3A.29 V22.2 POCT Urinalysis      2. Anemia, unspecified type  D64.9 285.9           P: Bleeding, daily fetal kick counts, and  labor/labor precautions discussed.    The following were addressed during this visit:    29-32 Weeks  - Tdap Given   - Contraception/Tubal Consent   - Pre-registration   - Circumsision plans   - Op note review/ consent   - Birth Plan   - Pediatrician   - Fetal Kick Counts/PIH/PTL precautions   - Preeclampsia Education   - Quiet time       Questions answered to desired level of satisfaction  Verbalized understanding to all information and instructions provided.  Follow up in about 2 weeks (around 2024), or if symptoms worsen or fail to improve, for JACQUELINE Hernandez, MARGOT, CNM, WHNP-BC

## 2025-01-07 ENCOUNTER — HOSPITAL ENCOUNTER (OUTPATIENT)
Dept: RADIOLOGY | Facility: HOSPITAL | Age: 30
Discharge: HOME | End: 2025-01-07
Attending: ADVANCED PRACTICE MIDWIFE
Payer: MEDICAID

## 2025-01-07 ENCOUNTER — ROUTINE PRENATAL (OUTPATIENT)
Dept: OBSTETRICS AND GYNECOLOGY | Facility: CLINIC | Age: 30
End: 2025-01-07
Payer: MEDICAID

## 2025-01-07 VITALS
WEIGHT: 206 LBS | BODY MASS INDEX: 35.36 KG/M2 | HEART RATE: 80 BPM | DIASTOLIC BLOOD PRESSURE: 75 MMHG | SYSTOLIC BLOOD PRESSURE: 114 MMHG

## 2025-01-07 DIAGNOSIS — D64.9 ANEMIA, UNSPECIFIED TYPE: ICD-10-CM

## 2025-01-07 DIAGNOSIS — Z3A.32 32 WEEKS GESTATION OF PREGNANCY: ICD-10-CM

## 2025-01-07 DIAGNOSIS — Z67.91 RH NEGATIVE STATE IN ANTEPARTUM PERIOD: ICD-10-CM

## 2025-01-07 DIAGNOSIS — Z3A.32 32 WEEKS GESTATION OF PREGNANCY: Primary | ICD-10-CM

## 2025-01-07 DIAGNOSIS — O26.899 RH NEGATIVE STATE IN ANTEPARTUM PERIOD: ICD-10-CM

## 2025-01-07 LAB
BILIRUB SERPL-MCNC: NEGATIVE MG/DL
BLOOD, POC UA: NEGATIVE
GLUCOSE UR QL STRIP: NEGATIVE
KETONES UR QL STRIP: NEGATIVE
LEUKOCYTE ESTERASE URINE, POC: NORMAL
NITRITE, POC UA: NEGATIVE
PH, POC UA: 6.5
PROTEIN, POC: NEGATIVE
SPECIFIC GRAVITY, POC UA: >=1.03
UROBILINOGEN, POC UA: 0.2

## 2025-01-07 PROCEDURE — 59426 ANTEPARTUM CARE ONLY: CPT | Mod: TH,,, | Performed by: ADVANCED PRACTICE MIDWIFE

## 2025-01-07 PROCEDURE — 76816 OB US FOLLOW-UP PER FETUS: CPT | Mod: TC

## 2025-01-07 PROCEDURE — 76816 OB US FOLLOW-UP PER FETUS: CPT | Mod: 26,,, | Performed by: RADIOLOGY

## 2025-01-07 NOTE — PROGRESS NOTES
29 y.o. female  at 32w5d   She c/o no problems today  Reports good fetal movement or fluttering. Denies any vaginal bleeding, leakage of fluid, cramping, contractions, or pressure.   Total weight gain/weight loss in pregnancy: 5.443 kg (12 lb)     Vitals  BP: 114/75  Pulse: 80  Weight: 93.4 kg (206 lb)  Prenatal  Fundal Height (cm): 33 cm  Fetal Heart Rate: 159  Movement: Present  Urine Albumin/Glucose  Urine Albumin: Negative  Urine Glucose: Negative  Edema  LLE Edema: None  RLE Edema: None  Facial: None  Additional Edema?: No    Prenatal Labs:  Lab Results   Component Value Date    GROUPTRH A NEG 2024    HGB 9.7 (L) 2024    HCT 31.9 (L) 2024     2024    SICKLE Negative 2024    HEPBSAG Non-Reactive 2024    [id number] Non-Reactive 2024    LABNGO Negative 2024    LABURIN No Growth 2024       A: 32w5d           ICD-10-CM ICD-9-CM    1. 32 weeks gestation of pregnancy  Z3A.32 V22.2 POCT Urinalysis      US OB 14+ Wks, TransAbd, Single Gestation      2. Rh negative state in antepartum period  O26.899 646.83     Z67.91        3. Anemia, unspecified type  D64.9 285.9           P: Bleeding, daily fetal kick counts, and  labor/labor precautions discussed.    No pregnancy checklist tasks were completed during this visit, and no tasks are pending completion.    US today @ 1:30 @ [facility]  Questions answered to desired level of satisfaction  Verbalized understanding to all information and instructions provided.  Follow up in about 2 weeks (around 2025), or if symptoms worsen or fail to improve, for PARESH visit.    Lucia Hernandez, MARGOT, CNM, WHNP-BC

## 2025-01-29 ENCOUNTER — ROUTINE PRENATAL (OUTPATIENT)
Dept: OBSTETRICS AND GYNECOLOGY | Facility: CLINIC | Age: 30
End: 2025-01-29
Payer: COMMERCIAL

## 2025-01-29 VITALS
BODY MASS INDEX: 35.77 KG/M2 | DIASTOLIC BLOOD PRESSURE: 70 MMHG | SYSTOLIC BLOOD PRESSURE: 121 MMHG | WEIGHT: 208.38 LBS | HEART RATE: 86 BPM

## 2025-01-29 DIAGNOSIS — Z67.91 RH NEGATIVE STATE IN ANTEPARTUM PERIOD: ICD-10-CM

## 2025-01-29 DIAGNOSIS — O60.00 PRETERM LABOR WITHOUT DELIVERY: ICD-10-CM

## 2025-01-29 DIAGNOSIS — Z11.3 SCREEN FOR SEXUALLY TRANSMITTED DISEASES: ICD-10-CM

## 2025-01-29 DIAGNOSIS — Z72.51 HIGH RISK HETEROSEXUAL BEHAVIOR: ICD-10-CM

## 2025-01-29 DIAGNOSIS — Z3A.35 35 WEEKS GESTATION OF PREGNANCY: Primary | ICD-10-CM

## 2025-01-29 DIAGNOSIS — O26.899 RH NEGATIVE STATE IN ANTEPARTUM PERIOD: ICD-10-CM

## 2025-01-29 LAB
AMPHET UR QL SCN: NEGATIVE
BACTERIAL VAGINOSIS DNA (OHS): NEGATIVE
BARBITURATES UR QL SCN: NEGATIVE
BASOPHILS # BLD AUTO: 0.03 K/UL (ref 0–0.2)
BASOPHILS NFR BLD AUTO: 0.4 % (ref 0–1)
BENZODIAZ METAB UR QL SCN: NEGATIVE
BILIRUB SERPL-MCNC: NORMAL MG/DL
BLOOD, POC UA: NORMAL
CANDIDA GLABRATA/KRUSEI DNA (OHS): NOT DETECTED
CANDIDA SPECIES DNA (OHS): NOT DETECTED
CANNABINOIDS UR QL SCN: NEGATIVE
COCAINE UR QL SCN: NEGATIVE
DIFFERENTIAL METHOD BLD: ABNORMAL
EOSINOPHIL # BLD AUTO: 0.08 K/UL (ref 0–0.5)
EOSINOPHIL NFR BLD AUTO: 1.1 % (ref 1–4)
ERYTHROCYTE [DISTWIDTH] IN BLOOD BY AUTOMATED COUNT: 13.5 % (ref 11.5–14.5)
GLUCOSE UR QL STRIP: NORMAL
HCT VFR BLD AUTO: 31.8 % (ref 38–47)
HGB BLD-MCNC: 9.8 G/DL (ref 12–16)
IMM GRANULOCYTES # BLD AUTO: 0.11 K/UL (ref 0–0.04)
IMM GRANULOCYTES NFR BLD: 1.5 % (ref 0–0.4)
KETONES UR QL STRIP: NORMAL
LEUKOCYTE ESTERASE URINE, POC: NORMAL
LYMPHOCYTES # BLD AUTO: 2.17 K/UL (ref 1–4.8)
LYMPHOCYTES NFR BLD AUTO: 29.9 % (ref 27–41)
MCH RBC QN AUTO: 25 PG (ref 27–31)
MCHC RBC AUTO-ENTMCNC: 30.8 G/DL (ref 32–36)
MCV RBC AUTO: 81.1 FL (ref 80–96)
MONOCYTES # BLD AUTO: 0.7 K/UL (ref 0–0.8)
MONOCYTES NFR BLD AUTO: 9.7 % (ref 2–6)
MPC BLD CALC-MCNC: 11.4 FL (ref 9.4–12.4)
NEUTROPHILS # BLD AUTO: 4.16 K/UL (ref 1.8–7.7)
NEUTROPHILS NFR BLD AUTO: 57.4 % (ref 53–65)
NITRITE, POC UA: NORMAL
NRBC # BLD AUTO: 0 X10E3/UL
NRBC, AUTO (.00): 0 %
OPIATES UR QL SCN: NEGATIVE
PCP UR QL SCN: NEGATIVE
PH, POC UA: 7
PLATELET # BLD AUTO: 195 K/UL (ref 150–400)
PROTEIN, POC: NORMAL
RBC # BLD AUTO: 3.92 M/UL (ref 4.2–5.4)
SPECIFIC GRAVITY, POC UA: 1.02
SYPHILIS AB INTERPRETATION: NORMAL
TRICHOMONAS VAGINALIS DNA (OHS): NOT DETECTED
UROBILINOGEN, POC UA: NORMAL
WBC # BLD AUTO: 7.25 K/UL (ref 4.5–11)

## 2025-01-29 PROCEDURE — 85025 COMPLETE CBC W/AUTO DIFF WBC: CPT | Mod: ,,, | Performed by: CLINICAL MEDICAL LABORATORY

## 2025-01-29 PROCEDURE — 87491 CHLMYD TRACH DNA AMP PROBE: CPT | Mod: ,,, | Performed by: CLINICAL MEDICAL LABORATORY

## 2025-01-29 PROCEDURE — 87591 N.GONORRHOEAE DNA AMP PROB: CPT | Mod: ,,, | Performed by: CLINICAL MEDICAL LABORATORY

## 2025-01-29 PROCEDURE — 81515 NFCT DS BV&VAGINITIS DNA ALG: CPT | Mod: ,,, | Performed by: CLINICAL MEDICAL LABORATORY

## 2025-01-29 PROCEDURE — 0502F SUBSEQUENT PRENATAL CARE: CPT | Mod: ,,, | Performed by: ADVANCED PRACTICE MIDWIFE

## 2025-01-29 PROCEDURE — 36415 COLL VENOUS BLD VENIPUNCTURE: CPT | Mod: ,,, | Performed by: ADVANCED PRACTICE MIDWIFE

## 2025-01-29 PROCEDURE — 80307 DRUG TEST PRSMV CHEM ANLYZR: CPT | Mod: ,,, | Performed by: CLINICAL MEDICAL LABORATORY

## 2025-01-29 PROCEDURE — 86780 TREPONEMA PALLIDUM: CPT | Mod: ,,, | Performed by: CLINICAL MEDICAL LABORATORY

## 2025-01-29 PROCEDURE — 96372 THER/PROPH/DIAG INJ SC/IM: CPT | Mod: ,,, | Performed by: ADVANCED PRACTICE MIDWIFE

## 2025-01-29 PROCEDURE — 87653 STREP B DNA AMP PROBE: CPT | Mod: ,,, | Performed by: CLINICAL MEDICAL LABORATORY

## 2025-01-29 RX ORDER — BETAMETHASONE SODIUM PHOSPHATE AND BETAMETHASONE ACETATE 3; 3 MG/ML; MG/ML
12.5 INJECTION, SUSPENSION INTRA-ARTICULAR; INTRALESIONAL; INTRAMUSCULAR; SOFT TISSUE
Status: SHIPPED | OUTPATIENT
Start: 2025-01-29 | End: 2025-01-31

## 2025-01-29 RX ADMIN — BETAMETHASONE SODIUM PHOSPHATE AND BETAMETHASONE ACETATE 12.48 MG: 3; 3 INJECTION, SUSPENSION INTRA-ARTICULAR; INTRALESIONAL; INTRAMUSCULAR; SOFT TISSUE at 04:01

## 2025-01-29 NOTE — LETTER
January 29, 2025    Emilie Petty  [address] [place] [address]             [facility]  [address] [facility] [place] [address]  Phone: [phone]  Fax: [phone] 01/29/2025     Emiliemarcella Petty   1995       Emilie Petty  is currently pregnant and her due date is Estimated Date of Delivery: 2/27/25.  This letter is to inform you the she will begin her maternity leave as of 01/29/2025. If you have any questions, please feel free to contact our office at the above listed information.      Sincerely,          Lucia Hernandez DNP, CNM, WHNP-BC  Doctor of Nursing Practice, Certified Nurse Midwife, Women's Health Nurse Practitioner

## 2025-01-29 NOTE — PROGRESS NOTES
"29 y.o. female  at 35w6d   She c/o "baby balling up"  Reports good fetal movement or fluttering. Denies any vaginal bleeding, leakage of fluid, cramping, contractions, or pressure.   Total weight gain/weight loss in pregnancy: 6.532 kg (14 lb 6.4 oz)     Vitals  BP: 121/70  Pulse: 86  Weight: 94.5 kg (208 lb 6.4 oz)  Prenatal  Fundal Height (cm): 37 cm  Fetal Heart Rate: 150  Movement: Present  Urine Albumin/Glucose  Urine Albumin: Negative  Urine Glucose: Negative  Edema  LLE Edema: None  RLE Edema: None  Facial: None  Additional Edema?: No  Cervical Exam  Dilation: 1  Effacement (%): 30  Station: -3  Presentation: Vertex  Station (Labor Curve): 8 cm  Dilation/Effacement/Station  Dilation: 1  Effacement (%): 30  Station: -3    Prenatal Labs:  Lab Results   Component Value Date    GROUPTRH A NEG 2024    HGB 9.7 (L) 2024    HCT 31.9 (L) 2024     2024    SICKLE Negative 2024    HEPBSAG Non-Reactive 2024    [id number] Non-Reactive 2024    LABNGO Negative 2024    LABURIN No Growth 2024       A: 35w6d           ICD-10-CM ICD-9-CM    1. 35 weeks gestation of pregnancy  Z3A.35 V22.2 CBC Auto Differential      Drug Screen, Urine      POCT Urinalysis      CBC Auto Differential      Drug Screen, Urine      2. Screen for sexually transmitted diseases  Z11.3 V74.5 Vaginosis Screen by DNA Probe      Chlamydia/GC, PCR      Syphilis Antibody with reflex to RPR      Strep B Screen, Antepartum      Syphilis Antibody with reflex to RPR      Vaginosis Screen by DNA Probe      Chlamydia/GC, PCR      Strep B Screen, Antepartum      3. High risk heterosexual behavior  Z72.51 V69.2 Vaginosis Screen by DNA Probe      Chlamydia/GC, PCR      Syphilis Antibody with reflex to RPR      Strep B Screen, Antepartum      Syphilis Antibody with reflex to RPR      Vaginosis Screen by DNA Probe      Chlamydia/GC, PCR      Strep B Screen, Antepartum      4. Rh negative state in " antepartum period  O26.899 646.83     Z67.91        5.  labor without delivery  O60.00 644.00 betamethasone acetate-betamethasone sodium phosphate injection 12.48 mg          P: Bleeding, daily fetal kick counts, and  labor/labor precautions discussed.    The following were addressed during this visit:    33-36 Weeks  - Childbirth Education/Hospital Tours   - Breastfeeding   - Group B Strep Test/HIV/RPR   - Fetal Kick Counts/PIH/PTL precautions     Place on maternity leave as of today due to  labor without delivery  No sex, pelvic rest, nothing in vagina, no nipple stimulation  Celestone 12 mg IM today and repeat tomorrow  Questions answered to desired level of satisfaction  Verbalized understanding to all information and instructions provided.  Follow up in about 1 week (around 2025), or if symptoms worsen or fail to improve, for PARESH in 1 wk and f/u tomorrow for repeat celestone .    Lucia Hernandez, MARGOT, CNM, WHNP-BC

## 2025-01-30 ENCOUNTER — ROUTINE PRENATAL (OUTPATIENT)
Dept: OBSTETRICS AND GYNECOLOGY | Facility: CLINIC | Age: 30
End: 2025-01-30
Payer: COMMERCIAL

## 2025-01-30 DIAGNOSIS — D64.9 ANEMIA, UNSPECIFIED TYPE: ICD-10-CM

## 2025-01-30 DIAGNOSIS — O60.00 PRETERM LABOR WITHOUT DELIVERY: Primary | ICD-10-CM

## 2025-01-30 LAB
CHLAMYDIA BY PCR: NEGATIVE
GROUP B STREP, PCR: POSITIVE
N. GONORRHOEAE (GC) BY PCR: NEGATIVE

## 2025-01-30 PROCEDURE — 96372 THER/PROPH/DIAG INJ SC/IM: CPT | Mod: ,,, | Performed by: ADVANCED PRACTICE MIDWIFE

## 2025-01-30 PROCEDURE — 0502F SUBSEQUENT PRENATAL CARE: CPT | Mod: ,,, | Performed by: ADVANCED PRACTICE MIDWIFE

## 2025-01-30 RX ORDER — FERROUS SULFATE 325(65) MG
325 TABLET, DELAYED RELEASE (ENTERIC COATED) ORAL 2 TIMES DAILY
Qty: 60 TABLET | Refills: 4 | Status: SHIPPED | OUTPATIENT
Start: 2025-01-30

## 2025-01-30 RX ORDER — BETAMETHASONE SODIUM PHOSPHATE AND BETAMETHASONE ACETATE 3; 3 MG/ML; MG/ML
12 INJECTION, SUSPENSION INTRA-ARTICULAR; INTRALESIONAL; INTRAMUSCULAR; SOFT TISSUE
Status: COMPLETED | OUTPATIENT
Start: 2025-01-30 | End: 2025-01-30

## 2025-01-30 RX ADMIN — BETAMETHASONE SODIUM PHOSPHATE AND BETAMETHASONE ACETATE 12 MG: 3; 3 INJECTION, SUSPENSION INTRA-ARTICULAR; INTRALESIONAL; INTRAMUSCULAR; SOFT TISSUE at 11:01

## 2025-01-30 NOTE — PROGRESS NOTES
Here for second dose of celestone. She denies any issues or problems. Celestone 12.5 mg given IM today per DON Powell LPN.

## 2025-02-05 ENCOUNTER — ROUTINE PRENATAL (OUTPATIENT)
Dept: OBSTETRICS AND GYNECOLOGY | Facility: CLINIC | Age: 30
End: 2025-02-05
Payer: COMMERCIAL

## 2025-02-05 VITALS
DIASTOLIC BLOOD PRESSURE: 74 MMHG | SYSTOLIC BLOOD PRESSURE: 139 MMHG | HEART RATE: 83 BPM | BODY MASS INDEX: 35.74 KG/M2 | WEIGHT: 208.19 LBS

## 2025-02-05 DIAGNOSIS — Z3A.36 36 WEEKS GESTATION OF PREGNANCY: Primary | ICD-10-CM

## 2025-02-05 DIAGNOSIS — B95.1 GROUP BETA STREP POSITIVE: ICD-10-CM

## 2025-02-05 LAB
BILIRUB SERPL-MCNC: NORMAL MG/DL
BLOOD, POC UA: NORMAL
GLUCOSE UR QL STRIP: NORMAL
KETONES UR QL STRIP: NORMAL
LEUKOCYTE ESTERASE URINE, POC: NORMAL
NITRITE, POC UA: NORMAL
PH, POC UA: 7
PROTEIN, POC: NORMAL
SPECIFIC GRAVITY, POC UA: 1.01
UROBILINOGEN, POC UA: 0.2

## 2025-02-05 PROCEDURE — 0502F SUBSEQUENT PRENATAL CARE: CPT | Mod: ,,, | Performed by: ADVANCED PRACTICE MIDWIFE

## 2025-02-05 NOTE — PROGRESS NOTES
29 y.o. female  at 36w6d   She c/o irregular pressure without any bleeding, leakage of fluid or decreased fetal movement  Reports good fetal movement or fluttering.    Total weight gain/weight loss in pregnancy: 6.441 kg (14 lb 3.2 oz)     Vitals  BP: 139/74  Pulse: 83  Weight: 94.4 kg (208 lb 3.2 oz)  Prenatal  Fundal Height (cm): 37 cm  Fetal Heart Rate: 145  Movement: Present  Urine Albumin/Glucose  Urine Albumin: Negative  Urine Glucose: Negative  Edema  LLE Edema: None  RLE Edema: None  Facial: None  Additional Edema?: No  Cervical Exam  Dilation: 1.5  Effacement (%): 30  Station: Ballotable  Presentation: Vertex  Station (Labor Curve): 9 cm  Dilation/Effacement/Station  Dilation: 1.5  Effacement (%): 30  Station: Ballotable    Prenatal Labs:  Lab Results   Component Value Date    GROUPTRH A NEG 2024    HGB 9.8 (L) 2025    HCT 31.8 (L) 2025     2025    SICKLE Negative 2024    HEPBSAG Non-Reactive 2024    [id number] Non-Reactive 2024    LABNGO Negative 2025    LABURIN No Growth 2024       A: 36w6d           ICD-10-CM ICD-9-CM    1. 36 weeks gestation of pregnancy  Z3A.36 V22.2 POCT Urinalysis      2. Group beta Strep positive  B95.1 041.02           P: Bleeding, daily fetal kick counts, and  labor/labor precautions discussed.    No pregnancy checklist tasks were completed during this visit, and no tasks are pending completion.    Common discomforts of pregnancy discussed  Questions answered to desired level of satisfaction  Verbalized understanding to all information and instructions provided.  Follow up in about 1 week (around 2025), or if symptoms worsen or fail to improve, for JACQUELINE Hernandez, DNP, CNM, WHNP-BC

## 2025-02-12 ENCOUNTER — ROUTINE PRENATAL (OUTPATIENT)
Dept: OBSTETRICS AND GYNECOLOGY | Facility: CLINIC | Age: 30
End: 2025-02-12
Payer: COMMERCIAL

## 2025-02-12 VITALS
DIASTOLIC BLOOD PRESSURE: 77 MMHG | HEART RATE: 77 BPM | BODY MASS INDEX: 36.97 KG/M2 | WEIGHT: 215.38 LBS | SYSTOLIC BLOOD PRESSURE: 128 MMHG

## 2025-02-12 DIAGNOSIS — Z3A.37 37 WEEKS GESTATION OF PREGNANCY: Primary | ICD-10-CM

## 2025-02-12 LAB
BILIRUB SERPL-MCNC: NORMAL MG/DL
BLOOD, POC UA: NORMAL
GLUCOSE UR QL STRIP: NORMAL
KETONES UR QL STRIP: NORMAL
LEUKOCYTE ESTERASE URINE, POC: NORMAL
NITRITE, POC UA: NORMAL
PH, POC UA: 5.5
PROTEIN, POC: NORMAL
SPECIFIC GRAVITY, POC UA: 1.02
UROBILINOGEN, POC UA: 1

## 2025-02-12 PROCEDURE — 0502F SUBSEQUENT PRENATAL CARE: CPT | Mod: ,,, | Performed by: ADVANCED PRACTICE MIDWIFE

## 2025-02-12 NOTE — PROGRESS NOTES
30 y.o. female  at 37w6d   She c/o no problems.   Reports good fetal movement or fluttering. Denies any vaginal bleeding, leakage of fluid, cramping, contractions, or pressure.   Total weight gain/weight loss in pregnancy: 9.707 kg (21 lb 6.4 oz)     Vitals  BP: 128/77  Pulse: 77  Weight: 97.7 kg (215 lb 6.4 oz)  Prenatal  Fundal Height (cm): 38 cm  Fetal Heart Rate: 147  Movement: Present  Urine Albumin/Glucose  Urine Albumin: Negative  Urine Glucose: Negative  Edema  LLE Edema: None  RLE Edema: None  Facial: None  Additional Edema?: No    Prenatal Labs:  Lab Results   Component Value Date    GROUPTRH A NEG 2024    HGB 9.8 (L) 2025    HCT 31.8 (L) 2025     2025    SICKLE Negative 2024    HEPBSAG Non-Reactive 2024    [id number] Non-Reactive 2024    LABNGO Negative 2025    LABURIN No Growth 2024       A: 37w6d           ICD-10-CM ICD-9-CM    1. 37 weeks gestation of pregnancy  Z3A.37 V22.2 POCT Urinalysis          P: Bleeding, daily fetal kick counts, and  labor/labor precautions discussed.    The following were addressed during this visit:    37-41 Weeks  - Postpartum Immunizations   - Hospital Stay Expectations   - When to go to the hospital   - Fetal kick counts/PIH/Bleeding/ROM/Labor precautions   - Labor induction policy   - Cervical ripening   - Breastfeeding review: benefits of breastfeeding, early skin to skin, 24/7 rooming in, exclusive breastfeeding for 6 months       Questions answered to desired level of satisfaction  Verbalized understanding to all information and instructions provided.  Follow up in about 1 week (around 2025), or if symptoms worsen or fail to improve, for JACQUELINE Hernandez, MARGOT, CNM, WHNP-BC

## 2025-02-19 ENCOUNTER — ROUTINE PRENATAL (OUTPATIENT)
Dept: OBSTETRICS AND GYNECOLOGY | Facility: CLINIC | Age: 30
End: 2025-02-19
Payer: COMMERCIAL

## 2025-02-19 VITALS
DIASTOLIC BLOOD PRESSURE: 68 MMHG | SYSTOLIC BLOOD PRESSURE: 122 MMHG | BODY MASS INDEX: 37.04 KG/M2 | HEART RATE: 86 BPM | WEIGHT: 215.81 LBS

## 2025-02-19 DIAGNOSIS — O26.899 RH NEGATIVE STATE IN ANTEPARTUM PERIOD: ICD-10-CM

## 2025-02-19 DIAGNOSIS — Z67.11 TYPE A BLOOD, RH NEGATIVE: ICD-10-CM

## 2025-02-19 DIAGNOSIS — B95.1 GROUP BETA STREP POSITIVE: ICD-10-CM

## 2025-02-19 DIAGNOSIS — Z3A.38 38 WEEKS GESTATION OF PREGNANCY: Primary | ICD-10-CM

## 2025-02-19 DIAGNOSIS — Z67.91 RH NEGATIVE STATE IN ANTEPARTUM PERIOD: ICD-10-CM

## 2025-02-19 LAB
BILIRUB SERPL-MCNC: NORMAL MG/DL
BLOOD, POC UA: NORMAL
GLUCOSE UR QL STRIP: NORMAL
KETONES UR QL STRIP: NORMAL
LEUKOCYTE ESTERASE URINE, POC: NORMAL
NITRITE, POC UA: NORMAL
PH, POC UA: 6
PROTEIN, POC: NORMAL
SPECIFIC GRAVITY, POC UA: 1.02
UROBILINOGEN, POC UA: 0.2

## 2025-02-19 NOTE — PROGRESS NOTES
30 y.o. female  at 38w6d   She c/o passing her mucous plug last week and having occasional cramping.  Reports good fetal movement or fluttering. Denies any vaginal bleeding, leakage of fluid, cramping, contractions, or pressure.   Total weight gain/weight loss in pregnancy: 9.888 kg (21 lb 12.8 oz)     Vitals  BP: 122/68  Pulse: 86  Weight: 97.9 kg (215 lb 12.8 oz)  Prenatal  Fundal Height (cm): 38 cm  Fetal Heart Rate: 147  Movement: Present  Urine Albumin/Glucose  Urine Albumin: Negative  Urine Glucose: Negative  Edema  LLE Edema: None  RLE Edema: None  Facial: None  Additional Edema?: No  Cervical Exam  Dilation: 3  Effacement (%): 30  Station: -3  Presentation: Vertex  Station (Labor Curve): 8 cm  Dilation/Effacement/Station  Dilation: 3  Effacement (%): 30  Station: -3    Prenatal Labs:  Lab Results   Component Value Date    GROUPTRH A NEG 2024    HGB 9.8 (L) 2025    HCT 31.8 (L) 2025     2025    SICKLE Negative 2024    HEPBSAG Non-Reactive 2024    [id number] Non-Reactive 2024    LABNGO Negative 2025    LABURIN No Growth 2024       A: 38w6d           ICD-10-CM ICD-9-CM    1. 38 weeks gestation of pregnancy  Z3A.38 V22.2 POCT Urinalysis      2. Rh negative state in antepartum period  O26.899 646.83     Z67.91        3. Group beta Strep positive  B95.1 041.02       4. Type A blood, Rh negative  Z67.11 V49.89           P: Bleeding, daily fetal kick counts, and  labor/labor precautions discussed.    No pregnancy checklist tasks were completed during this visit, and no tasks are pending completion.      Questions answered to desired level of satisfaction  Verbalized understanding to all information and instructions provided.  Follow up in about 6 days (around 2025), or if symptoms worsen or fail to improve, for PARESH, Ultrasound.    Lucia Hernandez, MARGOT, CNM, WHNP-BC

## 2025-02-25 ENCOUNTER — ROUTINE PRENATAL (OUTPATIENT)
Dept: OBSTETRICS AND GYNECOLOGY | Facility: CLINIC | Age: 30
End: 2025-02-25
Payer: COMMERCIAL

## 2025-02-25 ENCOUNTER — PROCEDURE VISIT (OUTPATIENT)
Dept: OBSTETRICS AND GYNECOLOGY | Facility: CLINIC | Age: 30
End: 2025-02-25
Payer: COMMERCIAL

## 2025-02-25 VITALS
BODY MASS INDEX: 37.21 KG/M2 | SYSTOLIC BLOOD PRESSURE: 131 MMHG | WEIGHT: 216.81 LBS | HEART RATE: 66 BPM | DIASTOLIC BLOOD PRESSURE: 77 MMHG

## 2025-02-25 DIAGNOSIS — O36.5930 POOR FETAL GROWTH AFFECTING MANAGEMENT OF MOTHER IN THIRD TRIMESTER, SINGLE OR UNSPECIFIED FETUS: ICD-10-CM

## 2025-02-25 DIAGNOSIS — Z3A.39 39 WEEKS GESTATION OF PREGNANCY: Primary | ICD-10-CM

## 2025-02-25 DIAGNOSIS — O36.8130 DECREASED FETAL MOVEMENTS IN THIRD TRIMESTER, SINGLE OR UNSPECIFIED FETUS: ICD-10-CM

## 2025-02-25 DIAGNOSIS — B95.1 GROUP BETA STREP POSITIVE: ICD-10-CM

## 2025-02-25 DIAGNOSIS — Z34.90 ENCOUNTER FOR INDUCTION OF LABOR: ICD-10-CM

## 2025-02-25 PROCEDURE — 0502F SUBSEQUENT PRENATAL CARE: CPT | Mod: ,,, | Performed by: ADVANCED PRACTICE MIDWIFE

## 2025-02-25 PROCEDURE — 76805 OB US >/= 14 WKS SNGL FETUS: CPT | Mod: ,,, | Performed by: OBSTETRICS & GYNECOLOGY

## 2025-02-25 PROCEDURE — 99499 UNLISTED E&M SERVICE: CPT | Mod: ,,, | Performed by: OBSTETRICS & GYNECOLOGY

## 2025-02-25 PROCEDURE — 76819 FETAL BIOPHYS PROFIL W/O NST: CPT | Mod: ,,, | Performed by: OBSTETRICS & GYNECOLOGY

## 2025-02-25 RX ORDER — BUTORPHANOL TARTRATE 1 MG/ML
1 INJECTION INTRAMUSCULAR; INTRAVENOUS
Status: CANCELLED | OUTPATIENT
Start: 2025-02-25

## 2025-02-25 RX ORDER — ACETAMINOPHEN 325 MG/1
650 TABLET ORAL EVERY 6 HOURS PRN
Status: CANCELLED | OUTPATIENT
Start: 2025-02-25

## 2025-02-25 RX ORDER — TERBUTALINE SULFATE 1 MG/ML
0.25 INJECTION SUBCUTANEOUS
Status: CANCELLED | OUTPATIENT
Start: 2025-02-25

## 2025-02-25 RX ORDER — SODIUM CHLORIDE, SODIUM LACTATE, POTASSIUM CHLORIDE, CALCIUM CHLORIDE 600; 310; 30; 20 MG/100ML; MG/100ML; MG/100ML; MG/100ML
INJECTION, SOLUTION INTRAVENOUS CONTINUOUS
Status: CANCELLED | OUTPATIENT
Start: 2025-02-25

## 2025-02-25 RX ORDER — CARBOPROST TROMETHAMINE 250 UG/ML
250 INJECTION, SOLUTION INTRAMUSCULAR
Status: CANCELLED | OUTPATIENT
Start: 2025-02-25

## 2025-02-25 RX ORDER — OXYTOCIN-SODIUM CHLORIDE 0.9% IV SOLN 30 UNIT/500ML 30-0.9/5 UT/ML-%
10 SOLUTION INTRAVENOUS ONCE AS NEEDED
Status: CANCELLED | OUTPATIENT
Start: 2025-02-25 | End: 2036-07-24

## 2025-02-25 RX ORDER — MISOPROSTOL 100 UG/1
800 TABLET ORAL ONCE AS NEEDED
Status: CANCELLED | OUTPATIENT
Start: 2025-02-25

## 2025-02-25 RX ORDER — CALCIUM CARBONATE 200(500)MG
500 TABLET,CHEWABLE ORAL 3 TIMES DAILY PRN
Status: CANCELLED | OUTPATIENT
Start: 2025-02-25

## 2025-02-25 RX ORDER — BUTORPHANOL TARTRATE 1 MG/ML
2 INJECTION INTRAMUSCULAR; INTRAVENOUS
Status: CANCELLED | OUTPATIENT
Start: 2025-02-25

## 2025-02-25 RX ORDER — MISOPROSTOL 100 MCG
50 TABLET ORAL EVERY 6 HOURS
Status: CANCELLED | OUTPATIENT
Start: 2025-02-25 | End: 2025-02-26

## 2025-02-25 RX ORDER — ONDANSETRON 4 MG/1
8 TABLET, ORALLY DISINTEGRATING ORAL EVERY 8 HOURS PRN
Status: CANCELLED | OUTPATIENT
Start: 2025-02-25

## 2025-02-25 RX ORDER — OXYTOCIN-SODIUM CHLORIDE 0.9% IV SOLN 30 UNIT/500ML 30-0.9/5 UT/ML-%
30 SOLUTION INTRAVENOUS ONCE AS NEEDED
Status: CANCELLED | OUTPATIENT
Start: 2025-02-25 | End: 2036-07-24

## 2025-02-25 RX ORDER — LIDOCAINE HYDROCHLORIDE 10 MG/ML
10 INJECTION, SOLUTION INFILTRATION; PERINEURAL ONCE AS NEEDED
Status: CANCELLED | OUTPATIENT
Start: 2025-02-25 | End: 2036-07-24

## 2025-02-25 RX ORDER — MISOPROSTOL 100 UG/1
800 TABLET ORAL ONCE AS NEEDED
Status: CANCELLED | OUTPATIENT
Start: 2025-02-25 | End: 2036-07-24

## 2025-02-25 RX ORDER — SODIUM CHLORIDE 9 MG/ML
INJECTION, SOLUTION INTRAVENOUS
Status: CANCELLED | OUTPATIENT
Start: 2025-02-25

## 2025-02-25 RX ORDER — DIPHENOXYLATE HYDROCHLORIDE AND ATROPINE SULFATE 2.5; .025 MG/1; MG/1
2 TABLET ORAL EVERY 6 HOURS PRN
Status: CANCELLED | OUTPATIENT
Start: 2025-02-25

## 2025-02-25 RX ORDER — METHYLERGONOVINE MALEATE 0.2 MG/ML
200 INJECTION INTRAVENOUS ONCE AS NEEDED
Status: CANCELLED | OUTPATIENT
Start: 2025-02-25 | End: 2036-07-24

## 2025-02-25 RX ORDER — SIMETHICONE 80 MG
1 TABLET,CHEWABLE ORAL 4 TIMES DAILY PRN
Status: CANCELLED | OUTPATIENT
Start: 2025-02-25

## 2025-02-25 RX ORDER — OXYTOCIN 10 [USP'U]/ML
10 INJECTION, SOLUTION INTRAMUSCULAR; INTRAVENOUS ONCE AS NEEDED
Status: CANCELLED | OUTPATIENT
Start: 2025-02-25 | End: 2036-07-24

## 2025-02-25 RX ORDER — MISOPROSTOL 100 MCG
50 TABLET ORAL EVERY 6 HOURS
Status: CANCELLED | OUTPATIENT
Start: 2025-02-27 | End: 2025-02-28

## 2025-02-25 NOTE — H&P (VIEW-ONLY)
History and Physical  Obstetrics      SUBJECTIVE:     Emilie Petty is a 30 y.o.  female with an Estimated Date of Delivery: 25 admitted for induction of labor.  Her current obstetrical history is significant for IUGR.  She reports no complaints. Fetal Movement: normal.    (Not in a hospital admission)      Review of patient's allergies indicates:  No Known Allergies     History reviewed. No pertinent past medical history.  Past Surgical History:   Procedure Laterality Date    MOUTH SURGERY       Family History   Problem Relation Name Age of Onset    Diabetes Maternal Grandmother      Hypertension Father      Ovarian cancer Mother      Ovarian cysts Mother       Social History[1]     OBJECTIVE:     Vital Signs (Most Recent)  Pulse: 66 (25 0913)  BP: 131/77 (25 0913)    Vitals  BP: 131/77  Pulse: 66  Weight: 98.3 kg (216 lb 12.8 oz)  Prenatal  Fundal Height (cm): 40 cm  Fetal Heart Rate: 154  Movement: Present  Urine Albumin/Glucose  Urine Albumin: Negative  Urine Glucose: Negative  Edema  LLE Edema: None  RLE Edema: None  Facial: None  Additional Edema?: No  Cervical Exam  Dilation: 3  Effacement (%): 30  Station: Ballotable  Presentation: Vertex  Station (Labor Curve): 9 cm  Dilation/Effacement/Station  Dilation: 3  Effacement (%): 30  Station: Ballotable    Physical Exam:  General:  alert, normal appearing gravid female, and cooperative   Skin:  Skin color, texture, turgor normal. No rashes or lesions   HEENT:  neck supple with midline trachea, thyroid without masses, and trachea midline   Lungs:  clear to auscultation bilaterally   Heart:  regular rate and rhythm, S1, S2 normal, no murmur, click, rub or gallop   Breasts:   deferred   Abdomen:  soft, non-tender; bowel sounds normal   Uterine Size:   40 cm   Presentations:  cephalic   FHT:  154 BPM   Pelvis: adequate   Cervix:     Dilation: 3cm    Effacement: 30%    Station:  -3    Consistency: medium    Position: middle      Laboratory:  Lab Results   Component Value Date    GROUPTRH A NEG 2024    HGB 9.8 (L) 2025    HCT 31.8 (L) 2025     2025    SICKLE Negative 2024    HEPBSAG Non-Reactive 2024    [id number] Non-Reactive 2024    LABNGO Negative 2025    LABURIN No Growth 2024     US today: EDC 3/13/2025 @ 37 weeks 5 days, growth percentile 69.50, vertex, posterior placenta, FHR @ 1554 bpm, BPP 8/8, LUBA 8.16 cm    ASSESSMENT/PLAN:     39w5d gestation.  Induction of labor   Conditions:  IUGR     Risks, benefits, alternatives and possible complications have been discussed in detail with the patient.  Pre-admission, admission, and post admission procedures and expectations were discussed in detail.  All questions answered to desired level of satisfaction. Admission is planned for 2025.   Obtain admission labs  May have analgesics if desired  May have epidural if desired  Reposition for comfort  Verbalized understanding to all instructions and information  Questions answered to desired level of satisfaction             [1]   Social History  Tobacco Use    Smoking status: Never     Passive exposure: Never    Smokeless tobacco: Never   Substance Use Topics    Alcohol use: Never    Drug use: Never

## 2025-02-25 NOTE — H&P
History and Physical  Obstetrics      SUBJECTIVE:     Emilie Petty is a 30 y.o.  female with an Estimated Date of Delivery: 25 admitted for induction of labor.  Her current obstetrical history is significant for IUGR.  She reports no complaints. Fetal Movement: normal.    (Not in a hospital admission)      Review of patient's allergies indicates:  No Known Allergies     History reviewed. No pertinent past medical history.  Past Surgical History:   Procedure Laterality Date    MOUTH SURGERY       Family History   Problem Relation Name Age of Onset    Diabetes Maternal Grandmother      Hypertension Father      Ovarian cancer Mother      Ovarian cysts Mother       Social History[1]     OBJECTIVE:     Vital Signs (Most Recent)  Pulse: 66 (25 0913)  BP: 131/77 (25 0913)    Vitals  BP: 131/77  Pulse: 66  Weight: 98.3 kg (216 lb 12.8 oz)  Prenatal  Fundal Height (cm): 40 cm  Fetal Heart Rate: 154  Movement: Present  Urine Albumin/Glucose  Urine Albumin: Negative  Urine Glucose: Negative  Edema  LLE Edema: None  RLE Edema: None  Facial: None  Additional Edema?: No  Cervical Exam  Dilation: 3  Effacement (%): 30  Station: Ballotable  Presentation: Vertex  Station (Labor Curve): 9 cm  Dilation/Effacement/Station  Dilation: 3  Effacement (%): 30  Station: Ballotable    Physical Exam:  General:  alert, normal appearing gravid female, and cooperative   Skin:  Skin color, texture, turgor normal. No rashes or lesions   HEENT:  neck supple with midline trachea, thyroid without masses, and trachea midline   Lungs:  clear to auscultation bilaterally   Heart:  regular rate and rhythm, S1, S2 normal, no murmur, click, rub or gallop   Breasts:   deferred   Abdomen:  soft, non-tender; bowel sounds normal   Uterine Size:   40 cm   Presentations:  cephalic   FHT:  154 BPM   Pelvis: adequate   Cervix:     Dilation: 3cm    Effacement: 30%    Station:  -3    Consistency: medium    Position: middle      Laboratory:  Lab Results   Component Value Date    GROUPTRH A NEG 2024    HGB 9.8 (L) 2025    HCT 31.8 (L) 2025     2025    SICKLE Negative 2024    HEPBSAG Non-Reactive 2024    [id number] Non-Reactive 2024    LABNGO Negative 2025    LABURIN No Growth 2024     US today: EDC 3/13/2025 @ 37 weeks 5 days, growth percentile 69.50, vertex, posterior placenta, FHR @ 1554 bpm, BPP 8/8, LUBA 8.16 cm    ASSESSMENT/PLAN:     39w5d gestation.  Induction of labor   Conditions:  IUGR     Risks, benefits, alternatives and possible complications have been discussed in detail with the patient.  Pre-admission, admission, and post admission procedures and expectations were discussed in detail.  All questions answered to desired level of satisfaction. Admission is planned for 2025.   Obtain admission labs  May have analgesics if desired  May have epidural if desired  Reposition for comfort  Verbalized understanding to all instructions and information  Questions answered to desired level of satisfaction             [1]   Social History  Tobacco Use    Smoking status: Never     Passive exposure: Never    Smokeless tobacco: Never   Substance Use Topics    Alcohol use: Never    Drug use: Never

## 2025-02-25 NOTE — PROGRESS NOTES
30 y.o. female  at 39w5d   She c/o no problems today  Reports good fetal movement or fluttering. Denies any vaginal bleeding, leakage of fluid, cramping, contractions, or pressure.   Total weight gain/weight loss in pregnancy: 10.3 kg (22 lb 12.8 oz)     Vitals  BP: 131/77  Pulse: 66  Weight: 98.3 kg (216 lb 12.8 oz)  Prenatal  Fundal Height (cm): 40 cm  Fetal Heart Rate: 154  Movement: Present  Urine Albumin/Glucose  Urine Albumin: Negative  Urine Glucose: Negative  Edema  LLE Edema: None  RLE Edema: None  Facial: None  Additional Edema?: No  Cervical Exam  Dilation: 3  Effacement (%): 30  Station: Ballotable  Presentation: Vertex  Station (Labor Curve): 9 cm  Dilation/Effacement/Station  Dilation: 3  Effacement (%): 30  Station: Ballotable    Prenatal Labs:  Lab Results   Component Value Date    GROUPTRH A NEG 2024    HGB 9.8 (L) 2025    HCT 31.8 (L) 2025     2025    SICKLE Negative 2024    HEPBSAG Non-Reactive 2024    [id number] Non-Reactive 2024    LABNGO Negative 2025    LABURIN No Growth 2024       A: 39w5d           ICD-10-CM ICD-9-CM    1. 39 weeks gestation of pregnancy  Z3A.39 V22.2 POCT Urinalysis      Admit to Inpatient      Full code      Vital Signs      Vital signs- Early Labor(0-4 cm)      Vital Signs- Active Labor (4-10 cm)      Vital Signs Post Delivery      Check Temperature, Membranes Intact      Check Temperature, Membranes Ruptured      Pulse Oximetry Continous while CONTINUOUS electronic fetal Monitor in use      Cervical Exam      Fetal / Uterine Monitoring      Fetal monitoring - scalp electrode      Insert peripheral IV      Straight Cath      Jaffe to [place]      Jaffe Catheter Care every 12 hours      Nurse to discontinue jaffe when patient no longer meets criteria      Post Jaffe Catheter Removal Protocol      Perform Bladder scan      Perform Intermittent Catheterization      Perform Bladder Scan, post intermittent cath       Place indwelling catheter      Chlorhexidine Bath      Decrease Oxytocin      Discontinue oxytocin      Oxytocin Titration Resumption      Amnioinfusion PRN recurrent variable decelerations      Administer a 500 -1000 ml IV fluid bolus as needed for      Assess fundal height/uterine firmness, lochia, episiotomy      Notify Physician      Notify physician (specify)      Notify physician       Notify physician       Notify Pediatrician immediately after delivery      Notify Nursery / Level II Nursery      Notify Nursery / Level II Nursery      Abdominal prep for  Section      Chlorhexidine (CHG) 2% Wipes      Place ILENE hose      Chlorohexidine Gluconate Bath      Vital signs every 15 minutes      miSOPROStol split tablet 50 mcg      Maternal vital signs, fetal and uterine assessments after each dose and hourly      Electronic fetal monitoring      May ambulate 30 minutes after insertion of Misoprostol (Cytotec)      Withhold Misoprostol (Cytotec) dosing:      miSOPROStoL tablet 800 mcg      miSOPROStoL tablet 800 mcg      Urinalysis, Reflex to Urine Culture      Urinalysis      CBC with Auto Differential      Comprehensive metabolic panel      Type & Screen      Syphilis Antibody (RPR)      Protime-INR      APTT      Fibrinogen      HIV 1/2 Ag/Ab (4th Gen)      Hepatitis B surface antigen      POCT Cord Blood Gas Umbilical Artery Cord Gas      POCT Cord Blood Gas Umbilical Vein Cord Gas      CBC auto differential      Type & Screen      Rubella antibody, IgG      Syphillis Antibody (RPR)      Hepatitis B surface antigen      Inpatient consult to Anesthesiology      2. Group beta Strep positive  B95.1 041.02       3. Poor fetal growth affecting management of mother in third trimester, single or unspecified fetus  O36.5930 656.53 Admit to Inpatient      Full code      Vital Signs      Vital signs- Early Labor(0-4 cm)      Vital Signs- Active Labor (4-10 cm)      Vital Signs Post Delivery      Check  Temperature, Membranes Intact      Check Temperature, Membranes Ruptured      Pulse Oximetry Continous while CONTINUOUS electronic fetal Monitor in use      Cervical Exam      Fetal / Uterine Monitoring      Fetal monitoring - scalp electrode      Insert peripheral IV      Straight Cath      Jaffe to [place]      Jaffe Catheter Care every 12 hours      Nurse to discontinue jaffe when patient no longer meets criteria      Post Jaffe Catheter Removal Protocol      Perform Bladder scan      Perform Intermittent Catheterization      Perform Bladder Scan, post intermittent cath      Place indwelling catheter      Chlorhexidine Bath      Decrease Oxytocin      Discontinue oxytocin      Oxytocin Titration Resumption      Amnioinfusion PRN recurrent variable decelerations      Administer a 500 -1000 ml IV fluid bolus as needed for      Assess fundal height/uterine firmness, lochia, episiotomy      Notify Physician      Notify physician (specify)      Notify physician       Notify physician       Notify Pediatrician immediately after delivery      Notify Nursery / Level II Nursery      Notify Nursery / Level II Nursery      Abdominal prep for  Section      Chlorhexidine (CHG) 2% Wipes      Place ILENE hose      Chlorohexidine Gluconate Bath      Vital signs every 15 minutes      miSOPROStol split tablet 50 mcg      Maternal vital signs, fetal and uterine assessments after each dose and hourly      Electronic fetal monitoring      May ambulate 30 minutes after insertion of Misoprostol (Cytotec)      Withhold Misoprostol (Cytotec) dosing:      miSOPROStoL tablet 800 mcg      miSOPROStoL tablet 800 mcg      Urinalysis, Reflex to Urine Culture      Urinalysis      CBC with Auto Differential      Comprehensive metabolic panel      Type & Screen      Syphilis Antibody (RPR)      Protime-INR      APTT      Fibrinogen      HIV 1/2 Ag/Ab (4th Gen)      Hepatitis B surface antigen      POCT Cord Blood Gas Umbilical Artery Cord  Gas      POCT Cord Blood Gas Umbilical Vein Cord Gas      CBC auto differential      Type & Screen      Rubella antibody, IgG      Syphillis Antibody (RPR)      Hepatitis B surface antigen      Inpatient consult to Anesthesiology      4. Encounter for induction of labor  Z34.90 V22.1 IP OB Labor Induction          P: Bleeding, daily fetal kick counts, and  labor/labor precautions discussed.  Recommended induction of labor today- pt states she want to be induced on . Discussed ensure daily fetal movement- if not noted, f/u at hospital immediately  Risks of induction discussed to include hyperstimulation, non reassuring FHTs, abruption, fetal demise, uterine rupture, risk of induction failure, risk of  section and/or possible surgical interventions to stop bleeding to include dilatation and curretage or hysterectomy.   No pregnancy checklist tasks were completed during this visit, and no tasks are pending completion.    Questions answered to desired level of satisfaction  Verbalized understanding to all information and instructions provided.  Follow up in about 3 weeks (around 3/18/2025), or if symptoms worsen or fail to improve, for postpartum visit/exam.    Lucia Hernandez, MARGOT, CNM, WHNP-BC

## 2025-02-26 ENCOUNTER — HOSPITAL ENCOUNTER (INPATIENT)
Facility: HOSPITAL | Age: 30
LOS: 3 days | Discharge: HOME | End: 2025-03-01
Attending: OBSTETRICS & GYNECOLOGY | Admitting: OBSTETRICS & GYNECOLOGY
Payer: COMMERCIAL

## 2025-02-26 DIAGNOSIS — Z3A.39 39 WEEKS GESTATION OF PREGNANCY: ICD-10-CM

## 2025-02-26 DIAGNOSIS — O36.5930 POOR FETAL GROWTH AFFECTING MANAGEMENT OF MOTHER IN THIRD TRIMESTER, SINGLE OR UNSPECIFIED FETUS: ICD-10-CM

## 2025-02-26 DIAGNOSIS — Z67.91 RH NEGATIVE STATE IN ANTEPARTUM PERIOD: ICD-10-CM

## 2025-02-26 DIAGNOSIS — O26.899 RH NEGATIVE, DELIVERED, CURRENT HOSPITALIZATION: ICD-10-CM

## 2025-02-26 DIAGNOSIS — O26.899 RH NEGATIVE STATE IN ANTEPARTUM PERIOD: ICD-10-CM

## 2025-02-26 DIAGNOSIS — Z67.91 RH NEGATIVE, DELIVERED, CURRENT HOSPITALIZATION: ICD-10-CM

## 2025-02-26 PROCEDURE — 3E033VJ INTRODUCTION OF OTHER HORMONE INTO PERIPHERAL VEIN, PERCUTANEOUS APPROACH: ICD-10-PCS | Performed by: ADVANCED PRACTICE MIDWIFE

## 2025-02-26 PROCEDURE — 63600175 PHARM REV CODE 636 W HCPCS: Performed by: ADVANCED PRACTICE MIDWIFE

## 2025-02-26 PROCEDURE — 81003 URINALYSIS AUTO W/O SCOPE: CPT | Performed by: ADVANCED PRACTICE MIDWIFE

## 2025-02-26 PROCEDURE — 25000003 PHARM REV CODE 250: Performed by: ADVANCED PRACTICE MIDWIFE

## 2025-02-26 PROCEDURE — 11000001 HC ACUTE MED/SURG PRIVATE ROOM

## 2025-02-26 RX ORDER — TRANEXAMIC ACID 10 MG/ML
1000 INJECTION, SOLUTION INTRAVENOUS EVERY 30 MIN PRN
Status: DISCONTINUED | OUTPATIENT
Start: 2025-02-26 | End: 2025-03-01 | Stop reason: HOSPADM

## 2025-02-26 RX ORDER — CARBOPROST TROMETHAMINE 250 UG/ML
250 INJECTION, SOLUTION INTRAMUSCULAR
Status: DISCONTINUED | OUTPATIENT
Start: 2025-02-26 | End: 2025-02-27

## 2025-02-26 RX ORDER — OXYTOCIN-SODIUM CHLORIDE 0.9% IV SOLN 30 UNIT/500ML 30-0.9/5 UT/ML-%
10 SOLUTION INTRAVENOUS ONCE AS NEEDED
Status: DISCONTINUED | OUTPATIENT
Start: 2025-02-26 | End: 2025-02-27

## 2025-02-26 RX ORDER — DIPHENOXYLATE HYDROCHLORIDE AND ATROPINE SULFATE 2.5; .025 MG/1; MG/1
2 TABLET ORAL EVERY 6 HOURS PRN
Status: DISCONTINUED | OUTPATIENT
Start: 2025-02-26 | End: 2025-02-27

## 2025-02-26 RX ORDER — OXYTOCIN/0.9 % SODIUM CHLORIDE 15/250 ML
95 PLASTIC BAG, INJECTION (ML) INTRAVENOUS ONCE AS NEEDED
Status: DISCONTINUED | OUTPATIENT
Start: 2025-02-26 | End: 2025-02-27

## 2025-02-26 RX ORDER — OXYTOCIN-SODIUM CHLORIDE 0.9% IV SOLN 30 UNIT/500ML 30-0.9/5 UT/ML-%
30 SOLUTION INTRAVENOUS ONCE AS NEEDED
Status: DISCONTINUED | OUTPATIENT
Start: 2025-02-26 | End: 2025-02-27

## 2025-02-26 RX ORDER — OXYTOCIN/0.9 % SODIUM CHLORIDE 15/250 ML
95 PLASTIC BAG, INJECTION (ML) INTRAVENOUS CONTINUOUS PRN
Status: DISCONTINUED | OUTPATIENT
Start: 2025-02-26 | End: 2025-02-27

## 2025-02-26 RX ORDER — ACETAMINOPHEN 325 MG/1
650 TABLET ORAL EVERY 6 HOURS PRN
Status: DISCONTINUED | OUTPATIENT
Start: 2025-02-26 | End: 2025-02-27

## 2025-02-26 RX ORDER — SODIUM CHLORIDE, SODIUM LACTATE, POTASSIUM CHLORIDE, CALCIUM CHLORIDE 600; 310; 30; 20 MG/100ML; MG/100ML; MG/100ML; MG/100ML
INJECTION, SOLUTION INTRAVENOUS CONTINUOUS
Status: DISCONTINUED | OUTPATIENT
Start: 2025-02-26 | End: 2025-02-27

## 2025-02-26 RX ORDER — OXYTOCIN 10 [USP'U]/ML
10 INJECTION, SOLUTION INTRAMUSCULAR; INTRAVENOUS ONCE AS NEEDED
Status: DISCONTINUED | OUTPATIENT
Start: 2025-02-26 | End: 2025-02-27

## 2025-02-26 RX ORDER — BUTORPHANOL TARTRATE 2 MG/ML
2 INJECTION INTRAMUSCULAR; INTRAVENOUS
Status: DISCONTINUED | OUTPATIENT
Start: 2025-02-26 | End: 2025-02-27

## 2025-02-26 RX ORDER — BUTORPHANOL TARTRATE 2 MG/ML
1 INJECTION INTRAMUSCULAR; INTRAVENOUS
Status: DISCONTINUED | OUTPATIENT
Start: 2025-02-26 | End: 2025-02-27

## 2025-02-26 RX ORDER — LIDOCAINE HYDROCHLORIDE 10 MG/ML
10 INJECTION, SOLUTION INFILTRATION; PERINEURAL ONCE AS NEEDED
Status: DISCONTINUED | OUTPATIENT
Start: 2025-02-26 | End: 2025-02-27

## 2025-02-26 RX ORDER — ONDANSETRON 4 MG/1
8 TABLET, ORALLY DISINTEGRATING ORAL EVERY 8 HOURS PRN
Status: DISCONTINUED | OUTPATIENT
Start: 2025-02-26 | End: 2025-02-27

## 2025-02-26 RX ORDER — MISOPROSTOL 200 UG/1
800 TABLET ORAL ONCE AS NEEDED
Status: DISCONTINUED | OUTPATIENT
Start: 2025-02-26 | End: 2025-03-01 | Stop reason: HOSPADM

## 2025-02-26 RX ORDER — SIMETHICONE 80 MG
1 TABLET,CHEWABLE ORAL 4 TIMES DAILY PRN
Status: DISCONTINUED | OUTPATIENT
Start: 2025-02-26 | End: 2025-02-27

## 2025-02-26 RX ORDER — METHYLERGONOVINE MALEATE 0.2 MG/ML
200 INJECTION INTRAVENOUS ONCE AS NEEDED
Status: COMPLETED | OUTPATIENT
Start: 2025-02-26 | End: 2025-02-27

## 2025-02-26 RX ORDER — TERBUTALINE SULFATE 1 MG/ML
0.25 INJECTION SUBCUTANEOUS
Status: DISCONTINUED | OUTPATIENT
Start: 2025-02-26 | End: 2025-03-01 | Stop reason: HOSPADM

## 2025-02-26 RX ORDER — MISOPROSTOL 200 UG/1
800 TABLET ORAL ONCE AS NEEDED
Status: COMPLETED | OUTPATIENT
Start: 2025-02-26 | End: 2025-02-27

## 2025-02-26 RX ORDER — SODIUM CHLORIDE 9 MG/ML
INJECTION, SOLUTION INTRAVENOUS
Status: DISCONTINUED | OUTPATIENT
Start: 2025-02-26 | End: 2025-02-27

## 2025-02-26 RX ORDER — MISOPROSTOL 100 MCG
50 TABLET ORAL EVERY 6 HOURS
Status: DISCONTINUED | OUTPATIENT
Start: 2025-02-27 | End: 2025-02-27

## 2025-02-26 RX ORDER — CALCIUM CARBONATE 200(500)MG
500 TABLET,CHEWABLE ORAL 3 TIMES DAILY PRN
Status: DISCONTINUED | OUTPATIENT
Start: 2025-02-26 | End: 2025-03-01 | Stop reason: HOSPADM

## 2025-02-26 RX ADMIN — AMPICILLIN SODIUM 2 G: 2 INJECTION, POWDER, FOR SOLUTION INTRAMUSCULAR; INTRAVENOUS at 11:02

## 2025-02-27 ENCOUNTER — ANESTHESIA (OUTPATIENT)
Dept: OBSTETRICS AND GYNECOLOGY | Facility: HOSPITAL | Age: 30
End: 2025-02-27
Payer: COMMERCIAL

## 2025-02-27 ENCOUNTER — ANESTHESIA EVENT (OUTPATIENT)
Dept: OBSTETRICS AND GYNECOLOGY | Facility: HOSPITAL | Age: 30
End: 2025-02-27
Payer: COMMERCIAL

## 2025-02-27 PROBLEM — O36.5930 POOR FETAL GROWTH AFFECTING MANAGEMENT OF MOTHER IN THIRD TRIMESTER: Status: ACTIVE | Noted: 2025-02-27

## 2025-02-27 LAB
ALBUMIN SERPL BCP-MCNC: 2.6 G/DL (ref 3.5–5)
ALBUMIN/GLOB SERPL: 0.6 {RATIO}
ALP SERPL-CCNC: 97 U/L (ref 40–150)
ALT SERPL W P-5'-P-CCNC: <7 U/L
ANION GAP SERPL CALCULATED.3IONS-SCNC: 12 MMOL/L (ref 7–16)
APTT PPP: 28.2 SECONDS (ref 25.2–37.3)
AST SERPL W P-5'-P-CCNC: 20 U/L (ref 5–34)
BASOPHILS # BLD AUTO: 0.03 K/UL (ref 0–0.2)
BASOPHILS NFR BLD AUTO: 0.4 % (ref 0–1)
BILIRUB SERPL-MCNC: 0.2 MG/DL
BILIRUB UR QL STRIP: NEGATIVE
BUN SERPL-MCNC: 8 MG/DL (ref 7–19)
BUN/CREAT SERPL: 12 (ref 6–20)
CALCIUM SERPL-MCNC: 8.5 MG/DL (ref 8.4–10.2)
CHLORIDE SERPL-SCNC: 107 MMOL/L (ref 98–107)
CLARITY UR: CLEAR
CO2 SERPL-SCNC: 21 MMOL/L (ref 22–29)
COLOR UR: COLORLESS
CREAT SERPL-MCNC: 0.69 MG/DL (ref 0.55–1.02)
DIFFERENTIAL METHOD BLD: ABNORMAL
EGFR (NO RACE VARIABLE) (RUSH/TITUS): 118 ML/MIN/1.73M2
EOSINOPHIL # BLD AUTO: 0.1 K/UL (ref 0–0.5)
EOSINOPHIL NFR BLD AUTO: 1.4 % (ref 1–4)
ERYTHROCYTE [DISTWIDTH] IN BLOOD BY AUTOMATED COUNT: 13.8 % (ref 11.5–14.5)
FSP TITR PPP LA: 469 MG/DL (ref 283–506)
GLOBULIN SER-MCNC: 4.1 G/DL (ref 2–4)
GLUCOSE SERPL-MCNC: 81 MG/DL (ref 74–100)
GLUCOSE UR STRIP-MCNC: NORMAL MG/DL
GROUP & RH: NORMAL
HBV SURFACE AG SERPL QL IA: NORMAL
HCT VFR BLD AUTO: 32.8 % (ref 38–47)
HGB BLD-MCNC: 10.1 G/DL (ref 12–16)
HIV 1+O+2 AB SERPL QL: NORMAL
IMM GRANULOCYTES # BLD AUTO: 0.12 K/UL (ref 0–0.04)
IMM GRANULOCYTES NFR BLD: 1.7 % (ref 0–0.4)
INDIRECT COOMBS: NORMAL
INDIRECT COOMBS: NORMAL
INR BLD: 1.26
KETONES UR STRIP-SCNC: NEGATIVE MG/DL
LEUKOCYTE ESTERASE UR QL STRIP: NEGATIVE
LYMPHOCYTES # BLD AUTO: 2.3 K/UL (ref 1–4.8)
LYMPHOCYTES NFR BLD AUTO: 33.2 % (ref 27–41)
MCH RBC QN AUTO: 24.6 PG (ref 27–31)
MCHC RBC AUTO-ENTMCNC: 30.8 G/DL (ref 32–36)
MCV RBC AUTO: 79.8 FL (ref 80–96)
MONOCYTES # BLD AUTO: 0.65 K/UL (ref 0–0.8)
MONOCYTES NFR BLD AUTO: 9.4 % (ref 2–6)
MPC BLD CALC-MCNC: 11.6 FL (ref 9.4–12.4)
NEUTROPHILS # BLD AUTO: 3.72 K/UL (ref 1.8–7.7)
NEUTROPHILS NFR BLD AUTO: 53.9 % (ref 53–65)
NITRITE UR QL STRIP: NEGATIVE
NRBC # BLD AUTO: 0 X10E3/UL
NRBC, AUTO (.00): 0 %
PH UR STRIP: 6 PH UNITS
PLATELET # BLD AUTO: 228 K/UL (ref 150–400)
POTASSIUM SERPL-SCNC: 4.1 MMOL/L (ref 3.5–5.1)
PROT SERPL-MCNC: 6.7 G/DL (ref 6.4–8.3)
PROT UR QL STRIP: NEGATIVE
PROTHROMBIN TIME: 15.7 SECONDS (ref 11.7–14.7)
RBC # BLD AUTO: 4.11 M/UL (ref 4.2–5.4)
RBC # UR STRIP: NEGATIVE /UL
RH BLD: NORMAL
ROSETTE - FMH (FETAL BLEED SCREEN): NORMAL
SODIUM SERPL-SCNC: 136 MMOL/L (ref 136–145)
SP GR UR STRIP: 1.01
SPECIMEN OUTDATE: NORMAL
SYPHILIS AB INTERPRETATION: NORMAL
UROBILINOGEN UR STRIP-ACNC: NORMAL MG/DL
WBC # BLD AUTO: 6.92 K/UL (ref 4.5–11)

## 2025-02-27 PROCEDURE — 25000003 PHARM REV CODE 250: Performed by: ADVANCED PRACTICE MIDWIFE

## 2025-02-27 PROCEDURE — 72100002 HC LABOR CARE, 1ST 8 HOURS

## 2025-02-27 PROCEDURE — 85730 THROMBOPLASTIN TIME PARTIAL: CPT | Performed by: ADVANCED PRACTICE MIDWIFE

## 2025-02-27 PROCEDURE — 85461 HEMOGLOBIN FETAL: CPT | Performed by: ADVANCED PRACTICE MIDWIFE

## 2025-02-27 PROCEDURE — 36415 COLL VENOUS BLD VENIPUNCTURE: CPT | Performed by: ADVANCED PRACTICE MIDWIFE

## 2025-02-27 PROCEDURE — 59400 OBSTETRICAL CARE: CPT | Mod: TH,,, | Performed by: ADVANCED PRACTICE MIDWIFE

## 2025-02-27 PROCEDURE — 63600175 PHARM REV CODE 636 W HCPCS: Performed by: ANESTHESIOLOGY

## 2025-02-27 PROCEDURE — 63600175 PHARM REV CODE 636 W HCPCS: Performed by: OBSTETRICS & GYNECOLOGY

## 2025-02-27 PROCEDURE — 0KQM0ZZ REPAIR PERINEUM MUSCLE, OPEN APPROACH: ICD-10-PCS | Performed by: ADVANCED PRACTICE MIDWIFE

## 2025-02-27 PROCEDURE — 87389 HIV-1 AG W/HIV-1&-2 AB AG IA: CPT | Performed by: ADVANCED PRACTICE MIDWIFE

## 2025-02-27 PROCEDURE — 51702 INSERT TEMP BLADDER CATH: CPT

## 2025-02-27 PROCEDURE — 62326 NJX INTERLAMINAR LMBR/SAC: CPT | Mod: AA | Performed by: ANESTHESIOLOGY

## 2025-02-27 PROCEDURE — 25000003 PHARM REV CODE 250: Performed by: ANESTHESIOLOGY

## 2025-02-27 PROCEDURE — 85384 FIBRINOGEN ACTIVITY: CPT | Performed by: ADVANCED PRACTICE MIDWIFE

## 2025-02-27 PROCEDURE — 87340 HEPATITIS B SURFACE AG IA: CPT | Performed by: ADVANCED PRACTICE MIDWIFE

## 2025-02-27 PROCEDURE — 25000003 PHARM REV CODE 250: Performed by: OBSTETRICS & GYNECOLOGY

## 2025-02-27 PROCEDURE — 11000001 HC ACUTE MED/SURG PRIVATE ROOM

## 2025-02-27 PROCEDURE — 63600175 PHARM REV CODE 636 W HCPCS: Performed by: ADVANCED PRACTICE MIDWIFE

## 2025-02-27 PROCEDURE — 86780 TREPONEMA PALLIDUM: CPT | Performed by: ADVANCED PRACTICE MIDWIFE

## 2025-02-27 PROCEDURE — C1751 CATH, INF, PER/CENT/MIDLINE: HCPCS | Performed by: ANESTHESIOLOGY

## 2025-02-27 PROCEDURE — 72200005 HC VAGINAL DELIVERY LEVEL II

## 2025-02-27 PROCEDURE — 80053 COMPREHEN METABOLIC PANEL: CPT | Performed by: ADVANCED PRACTICE MIDWIFE

## 2025-02-27 PROCEDURE — 85610 PROTHROMBIN TIME: CPT | Performed by: ADVANCED PRACTICE MIDWIFE

## 2025-02-27 PROCEDURE — 86762 RUBELLA ANTIBODY: CPT | Performed by: ADVANCED PRACTICE MIDWIFE

## 2025-02-27 PROCEDURE — 85025 COMPLETE CBC W/AUTO DIFF WBC: CPT | Performed by: ADVANCED PRACTICE MIDWIFE

## 2025-02-27 PROCEDURE — 86901 BLOOD TYPING SEROLOGIC RH(D): CPT | Performed by: ADVANCED PRACTICE MIDWIFE

## 2025-02-27 RX ORDER — PRENATAL WITH FERROUS FUM AND FOLIC ACID 3080; 920; 120; 400; 22; 1.84; 3; 20; 10; 1; 12; 200; 27; 25; 2 [IU]/1; [IU]/1; MG/1; [IU]/1; MG/1; MG/1; MG/1; MG/1; MG/1; MG/1; UG/1; MG/1; MG/1; MG/1; MG/1
1 TABLET ORAL DAILY
Status: DISCONTINUED | OUTPATIENT
Start: 2025-02-27 | End: 2025-02-27

## 2025-02-27 RX ORDER — SODIUM CHLORIDE 0.9 % (FLUSH) 0.9 %
3 SYRINGE (ML) INJECTION EVERY 8 HOURS PRN
Status: DISCONTINUED | OUTPATIENT
Start: 2025-02-27 | End: 2025-03-01 | Stop reason: HOSPADM

## 2025-02-27 RX ORDER — LIDOCAINE HYDROCHLORIDE 20 MG/ML
10 INJECTION, SOLUTION EPIDURAL; INFILTRATION; INTRACAUDAL; PERINEURAL EVERY 5 MIN PRN
Status: DISCONTINUED | OUTPATIENT
Start: 2025-02-27 | End: 2025-02-27

## 2025-02-27 RX ORDER — OXYCODONE HYDROCHLORIDE 5 MG/1
5 TABLET ORAL EVERY 4 HOURS PRN
Refills: 0 | Status: CANCELLED | OUTPATIENT
Start: 2025-02-27 | End: 2025-02-28

## 2025-02-27 RX ORDER — KETOROLAC TROMETHAMINE 30 MG/ML
30 INJECTION, SOLUTION INTRAMUSCULAR; INTRAVENOUS EVERY 6 HOURS
Status: CANCELLED | OUTPATIENT
Start: 2025-02-27 | End: 2025-02-28

## 2025-02-27 RX ORDER — PRENATAL WITH FERROUS FUM AND FOLIC ACID 3080; 920; 120; 400; 22; 1.84; 3; 20; 10; 1; 12; 200; 27; 25; 2 [IU]/1; [IU]/1; MG/1; [IU]/1; MG/1; MG/1; MG/1; MG/1; MG/1; MG/1; UG/1; MG/1; MG/1; MG/1; MG/1
1 TABLET ORAL DAILY
Status: DISCONTINUED | OUTPATIENT
Start: 2025-02-28 | End: 2025-03-01 | Stop reason: HOSPADM

## 2025-02-27 RX ORDER — LIDOCAINE HYDROCHLORIDE 20 MG/ML
INJECTION, SOLUTION EPIDURAL; INFILTRATION; INTRACAUDAL; PERINEURAL
Status: COMPLETED | OUTPATIENT
Start: 2025-02-27 | End: 2025-02-27

## 2025-02-27 RX ORDER — HYDROCORTISONE 25 MG/G
CREAM TOPICAL 3 TIMES DAILY PRN
Status: DISCONTINUED | OUTPATIENT
Start: 2025-02-27 | End: 2025-03-01 | Stop reason: HOSPADM

## 2025-02-27 RX ORDER — ACETAMINOPHEN 325 MG/1
650 TABLET ORAL EVERY 6 HOURS
Status: CANCELLED | OUTPATIENT
Start: 2025-02-27 | End: 2025-02-28

## 2025-02-27 RX ORDER — OXYTOCIN/0.9 % SODIUM CHLORIDE 15/250 ML
0-32 PLASTIC BAG, INJECTION (ML) INTRAVENOUS CONTINUOUS
Status: DISCONTINUED | OUTPATIENT
Start: 2025-02-27 | End: 2025-02-27

## 2025-02-27 RX ORDER — ONDANSETRON HYDROCHLORIDE 2 MG/ML
4 INJECTION, SOLUTION INTRAVENOUS EVERY 6 HOURS PRN
Status: CANCELLED | OUTPATIENT
Start: 2025-02-27 | End: 2025-02-28

## 2025-02-27 RX ORDER — OXYTOCIN/0.9 % SODIUM CHLORIDE 15/250 ML
95 PLASTIC BAG, INJECTION (ML) INTRAVENOUS ONCE AS NEEDED
Status: DISCONTINUED | OUTPATIENT
Start: 2025-02-27 | End: 2025-02-27

## 2025-02-27 RX ORDER — ACETAMINOPHEN AND CODEINE PHOSPHATE 300; 30 MG/1; MG/1
2 TABLET ORAL EVERY 4 HOURS PRN
Status: DISCONTINUED | OUTPATIENT
Start: 2025-02-27 | End: 2025-03-01 | Stop reason: HOSPADM

## 2025-02-27 RX ORDER — IBUPROFEN 800 MG/1
800 TABLET ORAL EVERY 8 HOURS PRN
Status: DISCONTINUED | OUTPATIENT
Start: 2025-02-27 | End: 2025-03-01 | Stop reason: HOSPADM

## 2025-02-27 RX ORDER — OXYTOCIN/0.9 % SODIUM CHLORIDE 15/250 ML
95 PLASTIC BAG, INJECTION (ML) INTRAVENOUS CONTINUOUS PRN
Status: DISCONTINUED | OUTPATIENT
Start: 2025-02-27 | End: 2025-02-27

## 2025-02-27 RX ORDER — LANOLIN ALCOHOL/MO/W.PET/CERES
1 CREAM (GRAM) TOPICAL 2 TIMES DAILY
Status: DISCONTINUED | OUTPATIENT
Start: 2025-02-27 | End: 2025-03-01 | Stop reason: HOSPADM

## 2025-02-27 RX ORDER — SIMETHICONE 80 MG
1 TABLET,CHEWABLE ORAL EVERY 6 HOURS PRN
Status: DISCONTINUED | OUTPATIENT
Start: 2025-02-27 | End: 2025-03-01 | Stop reason: HOSPADM

## 2025-02-27 RX ORDER — ACETAMINOPHEN 325 MG/1
650 TABLET ORAL EVERY 6 HOURS PRN
Status: DISCONTINUED | OUTPATIENT
Start: 2025-02-27 | End: 2025-03-01 | Stop reason: HOSPADM

## 2025-02-27 RX ORDER — OXYTOCIN-SODIUM CHLORIDE 0.9% IV SOLN 30 UNIT/500ML 30-0.9/5 UT/ML-%
30 SOLUTION INTRAVENOUS ONCE AS NEEDED
Status: DISCONTINUED | OUTPATIENT
Start: 2025-02-27 | End: 2025-02-27

## 2025-02-27 RX ORDER — LANOLIN ALCOHOL/MO/W.PET/CERES
1 CREAM (GRAM) TOPICAL 2 TIMES DAILY
Status: DISCONTINUED | OUTPATIENT
Start: 2025-02-27 | End: 2025-02-27

## 2025-02-27 RX ORDER — DOCUSATE SODIUM 100 MG/1
200 CAPSULE, LIQUID FILLED ORAL 2 TIMES DAILY PRN
Status: DISCONTINUED | OUTPATIENT
Start: 2025-02-27 | End: 2025-03-01 | Stop reason: HOSPADM

## 2025-02-27 RX ORDER — MISOPROSTOL 200 UG/1
800 TABLET ORAL ONCE AS NEEDED
Status: DISCONTINUED | OUTPATIENT
Start: 2025-02-27 | End: 2025-03-01 | Stop reason: HOSPADM

## 2025-02-27 RX ORDER — ACETAMINOPHEN AND CODEINE PHOSPHATE 300; 30 MG/1; MG/1
1 TABLET ORAL EVERY 4 HOURS PRN
Status: DISCONTINUED | OUTPATIENT
Start: 2025-02-27 | End: 2025-03-01 | Stop reason: HOSPADM

## 2025-02-27 RX ORDER — CARBOPROST TROMETHAMINE 250 UG/ML
250 INJECTION, SOLUTION INTRAMUSCULAR
Status: DISCONTINUED | OUTPATIENT
Start: 2025-02-27 | End: 2025-03-01 | Stop reason: HOSPADM

## 2025-02-27 RX ORDER — ONDANSETRON HYDROCHLORIDE 2 MG/ML
4 INJECTION, SOLUTION INTRAVENOUS EVERY 6 HOURS PRN
Status: DISCONTINUED | OUTPATIENT
Start: 2025-02-27 | End: 2025-03-01 | Stop reason: HOSPADM

## 2025-02-27 RX ORDER — DIPHENOXYLATE HYDROCHLORIDE AND ATROPINE SULFATE 2.5; .025 MG/1; MG/1
2 TABLET ORAL EVERY 6 HOURS PRN
Status: DISCONTINUED | OUTPATIENT
Start: 2025-02-27 | End: 2025-03-01 | Stop reason: HOSPADM

## 2025-02-27 RX ORDER — TRANEXAMIC ACID 10 MG/ML
1000 INJECTION, SOLUTION INTRAVENOUS EVERY 30 MIN PRN
Status: DISCONTINUED | OUTPATIENT
Start: 2025-02-27 | End: 2025-03-01 | Stop reason: HOSPADM

## 2025-02-27 RX ORDER — OXYCODONE HYDROCHLORIDE 5 MG/1
10 TABLET ORAL EVERY 4 HOURS PRN
Refills: 0 | Status: CANCELLED | OUTPATIENT
Start: 2025-02-27 | End: 2025-02-28

## 2025-02-27 RX ORDER — DIPHENHYDRAMINE HYDROCHLORIDE 50 MG/ML
25 INJECTION, SOLUTION INTRAMUSCULAR; INTRAVENOUS EVERY 4 HOURS PRN
Status: DISCONTINUED | OUTPATIENT
Start: 2025-02-27 | End: 2025-03-01 | Stop reason: HOSPADM

## 2025-02-27 RX ORDER — ONDANSETRON 4 MG/1
8 TABLET, ORALLY DISINTEGRATING ORAL EVERY 8 HOURS PRN
Status: DISCONTINUED | OUTPATIENT
Start: 2025-02-27 | End: 2025-03-01 | Stop reason: HOSPADM

## 2025-02-27 RX ORDER — ACETAMINOPHEN 325 MG/1
650 TABLET ORAL EVERY 6 HOURS SCHEDULED
Status: DISCONTINUED | OUTPATIENT
Start: 2025-02-27 | End: 2025-02-27

## 2025-02-27 RX ORDER — EPHEDRINE SULFATE 50 MG/ML
10 INJECTION, SOLUTION INTRAVENOUS ONCE
Status: DISCONTINUED | OUTPATIENT
Start: 2025-02-27 | End: 2025-02-27

## 2025-02-27 RX ORDER — METHYLERGONOVINE MALEATE 0.2 MG/ML
200 INJECTION INTRAVENOUS ONCE AS NEEDED
Status: DISCONTINUED | OUTPATIENT
Start: 2025-02-27 | End: 2025-03-01 | Stop reason: HOSPADM

## 2025-02-27 RX ORDER — OXYTOCIN 10 [USP'U]/ML
10 INJECTION, SOLUTION INTRAMUSCULAR; INTRAVENOUS ONCE AS NEEDED
Status: DISCONTINUED | OUTPATIENT
Start: 2025-02-27 | End: 2025-03-01 | Stop reason: HOSPADM

## 2025-02-27 RX ORDER — DIPHENHYDRAMINE HCL 25 MG
25 CAPSULE ORAL EVERY 4 HOURS PRN
Status: DISCONTINUED | OUTPATIENT
Start: 2025-02-27 | End: 2025-03-01 | Stop reason: HOSPADM

## 2025-02-27 RX ORDER — FENTANYL/ROPIVACAINE/NS/PF 2MCG/ML-.2
PLASTIC BAG, INJECTION (ML) INJECTION CONTINUOUS
Refills: 0 | Status: DISCONTINUED | OUTPATIENT
Start: 2025-02-27 | End: 2025-02-27

## 2025-02-27 RX ADMIN — OXYTOCIN 2 MILLI-UNITS/MIN: 10 INJECTION INTRAVENOUS at 01:02

## 2025-02-27 RX ADMIN — AMPICILLIN SODIUM 1 G: 1 INJECTION, POWDER, FOR SOLUTION INTRAMUSCULAR; INTRAVENOUS at 04:02

## 2025-02-27 RX ADMIN — BUTORPHANOL TARTRATE 2 MG: 2 INJECTION, SOLUTION INTRAMUSCULAR; INTRAVENOUS at 04:02

## 2025-02-27 RX ADMIN — SODIUM CHLORIDE, POTASSIUM CHLORIDE, SODIUM LACTATE AND CALCIUM CHLORIDE: 600; 310; 30; 20 INJECTION, SOLUTION INTRAVENOUS at 12:02

## 2025-02-27 RX ADMIN — LIDOCAINE HYDROCHLORIDE 10 ML: 20 INJECTION, SOLUTION INTRAVENOUS at 06:02

## 2025-02-27 RX ADMIN — FERROUS SULFATE TAB 325 MG (65 MG ELEMENTAL FE) 1 EACH: 325 (65 FE) TAB at 09:02

## 2025-02-27 RX ADMIN — SODIUM CHLORIDE, POTASSIUM CHLORIDE, SODIUM LACTATE AND CALCIUM CHLORIDE: 600; 310; 30; 20 INJECTION, SOLUTION INTRAVENOUS at 05:02

## 2025-02-27 RX ADMIN — ROPIVACAINE HYDROCHLORIDE 500 MCG: 10 INJECTION, SOLUTION EPIDURAL at 06:02

## 2025-02-27 NOTE — PLAN OF CARE
Problem: Adult Inpatient Plan of Care  Goal: Plan of Care Review  2/26/2025 2333 by Ana Maria Oconnell RN  Outcome: Progressing  2/26/2025 2333 by Ana Maria Oconnell RN  Outcome: Progressing  Goal: Patient-Specific Goal (Individualized)  2/26/2025 2333 by Ana Maria Oconnell RN  Outcome: Progressing  2/26/2025 2333 by Ana Maria Oconnell RN  Outcome: Progressing  Goal: Absence of Hospital-Acquired Illness or Injury  2/26/2025 2333 by Ana Maria Oconnell RN  Outcome: Progressing  2/26/2025 2333 by Ana Maria Oconnell, RN  Outcome: Progressing  Goal: Optimal Comfort and Wellbeing  2/26/2025 2333 by Ana Maria Oconnell RN  Outcome: Progressing  2/26/2025 2333 by Ana Maria Oconnell RN  Outcome: Progressing  Goal: Readiness for Transition of Care  2/26/2025 2333 by Ana Maria Oconnell RN  Outcome: Progressing  2/26/2025 2333 by Ana Maria Oconnell RN  Outcome: Progressing     Problem: Labor  Goal: Hemostasis  2/26/2025 2333 by Ana Maria Oconnell, RN  Outcome: Progressing  2/26/2025 2333 by Ana Maria Oconnell RN  Outcome: Progressing  Goal: Stable Fetal Wellbeing  2/26/2025 2333 by Ana Maria Oconnell RN  Outcome: Progressing  2/26/2025 2333 by Ana Maria Oconnell RN  Outcome: Progressing  Goal: Effective Progression to Delivery  2/26/2025 2333 by Ana Maria Oconnell, RN  Outcome: Progressing  2/26/2025 2333 by Ana Maria Oconnell, RN  Outcome: Progressing  Goal: Absence of Infection Signs and Symptoms  2/26/2025 2333 by Ana Maria Oconnell, RN  Outcome: Progressing  2/26/2025 2333 by Ana Maria Oconnell RN  Outcome: Progressing  Goal: Acceptable Pain Control  2/26/2025 2333 by Ana Maria Oconnell, RN  Outcome: Progressing  2/26/2025 2333 by Ana Maria Oconnell RN  Outcome: Progressing  Goal: Normal Uterine Contraction Pattern  2/26/2025 2333 by Ana Maria Oconnell, RN  Outcome: Progressing  2/26/2025 2333 by Ana Maria Oconnell, RN  Outcome: Progressing

## 2025-02-27 NOTE — HOSPITAL COURSE
Pt is a  that was scheduled for an induction of labor secondary to IUGR. She is 40 weeks 0 days. She is Rh negative and GBS positive.  She has a h/o anemia in the pregnancy. She is being induced with pitocin. She proceeded to deliver a male  with Apgars 8 and 9 weighing 8 pounds 0.6 ounces experiencing a second degree laceration that was repaired and a right labial laceration that was repaired. She is breast and bottle feeding.

## 2025-02-27 NOTE — ANESTHESIA PROCEDURE NOTES
Epidural    Patient location during procedure: OB   Reason for block: primary anesthetic   Reason for block: labor analgesia requested by patient and obstetrician  Diagnosis: IUP   Start time: 2/27/2025 5:58 AM  Timeout: 2/27/2025 5:56 AM  End time: 2/27/2025 6:05 AM    Staffing  Performing Provider: Hernan Dobson MD  Authorizing Provider: Hernan Dobson MD    Staffing  Performed by: Hernan Dobson MD  Authorized by: Hernan Dobson MD        Preanesthetic Checklist  Completed: patient identified, pre-op evaluation, timeout performed, anesthesia consent given, hand hygiene performed and patient being monitored  Preparation  Patient position: sitting  Prep: Betadine  Reason for block: primary anesthetic   Epidural  Skin Anesthetic: lidocaine 1%  Administration type: single shot  Approach: midline  Interspace: L3-4    Injection technique: ALISA air  Needle and Epidural Catheter  Insertion Attempts: 1  Test dose: 3 mL of lidocaine 1.5% with Epi 1-to-200,000  Additional Documentation: negative aspiration for heme and CSF  Needle localization: anatomical landmarks  Assessment  Ease of block: easy  Additional Notes  Informed consent. Aseptic technique.   L3/4 epidural catheter. Standard PCEA.   No complications.         Medications:    Medications: LIDOcaine (PF) 20 mg/mL (2%) injection - Epidural   10 mL - 2/27/2025 6:04:00 AM

## 2025-02-27 NOTE — ANESTHESIA PREPROCEDURE EVALUATION
2025  Emilie Petty is a 30 y.o., female.      Pre-op Assessment    I have reviewed the Patient Summary Reports.       I have reviewed the Medications.     Review of Systems         Anesthesia Plan  Type of Anesthesia, risks & benefits discussed:    Anesthesia Type: Epidural  Intra-op Monitoring Plan: Standard ASA Monitors  Informed Consent: Informed consent signed with the Patient and all parties understand the risks and agree with anesthesia plan.  All questions answered.   ASA Score: 2    Ready For Surgery From Anesthesia Perspective.     .  No known anesthetic complications  NKDA    Hct 33     at 40 weeks    Adequate ROM at neck    Plan is labor epidural

## 2025-02-27 NOTE — L&D DELIVERY NOTE
"[facility] -  Labor and Delivery  Vaginal Delivery   Operative Note    SUMMARY     Normal spontaneous vaginal delivery of live infant, was placed on mothers abdomen for skin to skin and bulb suctioning performed.  Infant delivered position JULIO CESAR  position .  Nuchal cord: No.    Spontaneous delivery of placenta and IV pitocin given noting good uterine tone.  2nd degree laceration noted and repaired in normal fashion with 2-0 and 3-0 rapide .  Patient tolerated delivery well. Sponge needle and lap counted correctly x2.    Indications: Poor fetal growth affecting management of mother in third trimester  Pregnancy complicated by: Problem List[1]  Admitting GA: 40w0d    Delivery Information for Phu Petty    Birth information:  YOB: 2027   Time of birth: 8:26 AM   Sex: male   Head Delivery Date/Time: 2025  8:26 AM   Delivery type: Vaginal, Spontaneous   Gestational Age: 40w0d       Delivery Providers    Delivering clinician: Lucia Hernandez CNM   Provider Role    Madeleine Wyman, RN Delivery Nurse    Teresita Pantoja, [facility]    Matt Yee [facility]    Baylee Gay, RN NICU              Measurements    Weight: 3647 g  Weight (lbs): 8 lb 0.6 oz  Length: 53.3 cm  Length (in): 21"  Head circumference: 33 cm  Chest circumference: 32 cm  Abdominal girth: 35.5 cm         Apgars    Living status: Living  Apgar Component Scores:  1 min.:  5 min.:  10 min.:  15 min.:  20 min.:    Skin color:  1  1       Heart rate:  2  2       Reflex irritability:  2  2       Muscle tone:  2  2       Respiratory effort:  2  2       Total:  9  9       Apgars assigned by: PEGGY GAY RN NICU         Operative Delivery    Forceps attempted?: No  Vacuum extractor attempted?: No         Shoulder Dystocia    Shoulder dystocia present?: No           Presentation    Presentation: Vertex  Position: Right Occiput Anterior           Interventions/Resuscitation    Method: Bulb Suctioning   "     Cord    Vessels: 3 vessels  Complications: None  Delayed Cord Clamping?: Yes  Cord Clamped Date/Time: 2025  8:27 AM  Cord Blood Disposition: Sent with Baby  Gases Sent?: No  Stem Cell Collection (by MD): No       Placenta    Placenta delivery date/time: 2025 08:34  Placenta removal: Spontaneous  Placenta appearance: Intact  Placenta disposition: Discarded           Labor Events:       labor: No     Labor Onset Date/Time: 2025 04:20     Dilation Complete Date/Time: 2025 08:07     Start Pushing Date/Time: 2025 08:16     Rupture Date/Time: 25 0420        Rupture type: SRM (Spontaneous Rupture)        Fluid Amount:       Fluid Color: Clear      steroids: None     Antibiotics given for GBS: Yes     Induction: oxytocin     Indications for induction:  Elective;Intrauterine Growth Restriction     Augmentation:       Indications for augmentation:       Labor complications: None     Additional complications:          Cervical ripening:                     Delivery:      Episiotomy: None     Indication for Episiotomy:       Perineal Lacerations: 2nd Repaired:  Yes   Periurethral Laceration:   Repaired:     Labial Laceration: right Repaired: Yes   Sulcus Laceration:   Repaired:     Vaginal Laceration:   Repaired:     Cervical Laceration:   Repaired:     Repair suture:       Repair # of packets: 2     Last Value - EBL - Nursing (mL):       Sum - EBL - Nursing (mL): 0     Last Value - EBL - Anesthesia (mL):      Calculated QBL (mL): 385     Running total QBL (mL): 385     Vaginal Sweep Performed: Yes     Surgicount Correct: Yes       Other providers:       Anesthesia    Method: Epidural          Details (if applicable):  Trial of Labor      Categorization:      Priority:     Indications for :     Incision Type:       Additional  information:  Forceps:    Vacuum:    Breech:    Observed anomalies    Other (Comments): Patient was found to be  completely effaced and completely dilated at a +2 station. Patient subsequently delivered a male liveborn infant via spontaneous vaginal delivery. Baby's head was delivered in JULIO CESAR position. No Nuchal cord was noted. Anterior and posterior shoulders were delivered with ease followed by the body and was delivered with ease. Mouth and nose bulb suctioned. The baby was placed onto maternal abdomen, umbilical cord was doubly clamped and cut. Cord blood was obtained and sent. The placenta was delivered spontaneously via  Cristian, noted intact with a  three-vessel cord and normal cord insertion. The uterus was massaged until firm and well contracted. Uterine sweep performed with small amount of clots removed. Hemostasis was found to be adequate. The perineum was noted with a second degree vaginal laceration and a right labial laceration that was repaired with 2-0 and 3-0 Rapide. All sponge lap and instrument counts were correct ×2. Baby and mother are in stable condition. Mother desires to breast and bottle feed              [1]   Patient Active Problem List  Diagnosis    Rh negative state in antepartum period    Poor fetal growth affecting management of mother in third trimester

## 2025-02-28 LAB
BASOPHILS # BLD AUTO: 0.03 K/UL (ref 0–0.2)
BASOPHILS NFR BLD AUTO: 0.2 % (ref 0–1)
DIFFERENTIAL METHOD BLD: ABNORMAL
EOSINOPHIL # BLD AUTO: 0.06 K/UL (ref 0–0.5)
EOSINOPHIL NFR BLD AUTO: 0.5 % (ref 1–4)
ERYTHROCYTE [DISTWIDTH] IN BLOOD BY AUTOMATED COUNT: 13.8 % (ref 11.5–14.5)
HCT VFR BLD AUTO: 28 % (ref 38–47)
HGB BLD-MCNC: 8.6 G/DL (ref 12–16)
IMM GRANULOCYTES # BLD AUTO: 0.09 K/UL (ref 0–0.04)
IMM GRANULOCYTES NFR BLD: 0.7 % (ref 0–0.4)
LYMPHOCYTES # BLD AUTO: 2.57 K/UL (ref 1–4.8)
LYMPHOCYTES NFR BLD AUTO: 20.9 % (ref 27–41)
MCH RBC QN AUTO: 24.5 PG (ref 27–31)
MCHC RBC AUTO-ENTMCNC: 30.7 G/DL (ref 32–36)
MCV RBC AUTO: 79.8 FL (ref 80–96)
MONOCYTES # BLD AUTO: 1.18 K/UL (ref 0–0.8)
MONOCYTES NFR BLD AUTO: 9.6 % (ref 2–6)
MPC BLD CALC-MCNC: 11.2 FL (ref 9.4–12.4)
NEUTROPHILS # BLD AUTO: 8.39 K/UL (ref 1.8–7.7)
NEUTROPHILS NFR BLD AUTO: 68.1 % (ref 53–65)
NRBC # BLD AUTO: 0 X10E3/UL
NRBC, AUTO (.00): 0 %
PLATELET # BLD AUTO: 194 K/UL (ref 150–400)
RBC # BLD AUTO: 3.51 M/UL (ref 4.2–5.4)
RH IMMUNE GLOBULIN: NORMAL
WBC # BLD AUTO: 12.32 K/UL (ref 4.5–11)

## 2025-02-28 PROCEDURE — 36415 COLL VENOUS BLD VENIPUNCTURE: CPT | Performed by: ADVANCED PRACTICE MIDWIFE

## 2025-02-28 PROCEDURE — 3E0234Z INTRODUCTION OF SERUM, TOXOID AND VACCINE INTO MUSCLE, PERCUTANEOUS APPROACH: ICD-10-PCS | Performed by: OBSTETRICS & GYNECOLOGY

## 2025-02-28 PROCEDURE — 85025 COMPLETE CBC W/AUTO DIFF WBC: CPT | Performed by: ADVANCED PRACTICE MIDWIFE

## 2025-02-28 PROCEDURE — 25000003 PHARM REV CODE 250: Performed by: ADVANCED PRACTICE MIDWIFE

## 2025-02-28 PROCEDURE — 11000001 HC ACUTE MED/SURG PRIVATE ROOM

## 2025-02-28 PROCEDURE — 63600519 RHOGAM PHARM REV CODE 636 ALT 250 W HCPCS: Performed by: OBSTETRICS & GYNECOLOGY

## 2025-02-28 RX ORDER — IBUPROFEN 800 MG/1
800 TABLET ORAL EVERY 8 HOURS PRN
Qty: 60 TABLET | Refills: 1 | Status: SHIPPED | OUTPATIENT
Start: 2025-02-28

## 2025-02-28 RX ADMIN — IBUPROFEN 800 MG: 800 TABLET, FILM COATED ORAL at 12:02

## 2025-02-28 RX ADMIN — HUMAN RHO(D) IMMUNE GLOBULIN 300 MCG: 1500 SOLUTION INTRAMUSCULAR; INTRAVENOUS at 10:02

## 2025-02-28 RX ADMIN — PRENATAL VITAMINS-IRON FUMARATE 27 MG IRON-FOLIC ACID 0.8 MG TABLET 1 TABLET: at 08:02

## 2025-02-28 RX ADMIN — FERROUS SULFATE TAB 325 MG (65 MG ELEMENTAL FE) 1 EACH: 325 (65 FE) TAB at 08:02

## 2025-02-28 RX ADMIN — DOCUSATE SODIUM 200 MG: 100 CAPSULE, LIQUID FILLED ORAL at 08:02

## 2025-02-28 RX ADMIN — FERROUS SULFATE TAB 325 MG (65 MG ELEMENTAL FE) 1 EACH: 325 (65 FE) TAB at 09:02

## 2025-02-28 RX ADMIN — ACETAMINOPHEN AND CODEINE PHOSPHATE 1 TABLET: 300; 30 TABLET ORAL at 06:02

## 2025-02-28 NOTE — ANESTHESIA POSTPROCEDURE EVALUATION
Anesthesia Post Evaluation    Patient: Emilie Petty    Procedure(s) Performed: * No procedures listed *    Final Anesthesia Type: epidural      Patient location during evaluation: labor & delivery  Post-procedure vital signs: reviewed and stable  Pain management: adequate  Airway patency: patent  PATRICIO mitigation strategies: Use of major conduction anesthesia (spinal/epidural) or peripheral nerve block  PONV status at discharge: No PONV  Anesthetic complications: no      Cardiovascular status: hemodynamically stable  Respiratory status: unassisted  Hydration status: euvolemic  Follow-up not needed.              Vitals Value Taken Time   /85 02/28/25 13:16   Temp 36.7 °C (98.1 °F) 02/28/25 13:16   Pulse 74 02/28/25 13:16   Resp 20 02/28/25 06:43   SpO2 100 % 02/28/25 13:16         No case tracking events are documented in the log.      Pain/Edward Score: Pain Rating Prior to Med Admin: 4 (2/28/2025  6:43 AM)  Pain Rating Post Med Admin: 0 (2/28/2025  3:41 AM)

## 2025-02-28 NOTE — PLAN OF CARE
Problem: Adult Inpatient Plan of Care  Goal: Plan of Care Review  Outcome: Progressing  Goal: Patient-Specific Goal (Individualized)  Outcome: Progressing  Goal: Absence of Hospital-Acquired Illness or Injury  Outcome: Progressing  Goal: Optimal Comfort and Wellbeing  Outcome: Progressing  Goal: Readiness for Transition of Care  Outcome: Progressing     Problem: Infection  Goal: Absence of Infection Signs and Symptoms  Outcome: Progressing     Problem:  Fall Injury Risk  Goal: Absence of Fall, Infant Drop and Related Injury  Outcome: Progressing     Problem: Postpartum (Vaginal Delivery)  Goal: Successful Parent Role Transition  Outcome: Progressing  Goal: Hemostasis  Outcome: Progressing  Goal: Absence of Infection Signs and Symptoms  Outcome: Progressing  Goal: Anesthesia/Sedation Recovery  Outcome: Progressing  Goal: Optimal Pain Control and Function  Outcome: Progressing  Goal: Effective Urinary Elimination  Outcome: Progressing

## 2025-02-28 NOTE — SUBJECTIVE & OBJECTIVE
Interval History: PPD 1    She is doing well this morning. She is tolerating a regular diet without nausea or vomiting. She is voiding spontaneously. She is ambulating. She has passed flatus, and has a BM. Vaginal bleeding is mild. She denies fever or chills. Abdominal pain is mild and controlled with oral medications. She Is breastfeeding. She desires circumcision for her male baby: yes.    Objective:     Vital Signs (Most Recent):  Temp: 98.4 °F (36.9 °C) (02/28/25 0735)  Pulse: 84 (02/28/25 0735)  Resp: 20 (02/28/25 0643)  BP: 125/80 (02/28/25 0735)  SpO2: 99 % (02/28/25 0735) Vital Signs (24h Range):  Temp:  [97.6 °F (36.4 °C)-98.7 °F (37.1 °C)] 98.4 °F (36.9 °C)  Pulse:  [] 84  Resp:  [16-20] 20  SpO2:  [98 %-99 %] 99 %  BP: (110-135)/(56-80) 125/80     Weight: 98.4 kg (217 lb)  Body mass index is 37.25 kg/m².      Intake/Output Summary (Last 24 hours) at 2/28/2025 0940  Last data filed at 2/27/2025 1500  Gross per 24 hour   Intake --   Output 1100 ml   Net -1100 ml         Significant Labs:  Lab Results   Component Value Date    GROUPTRH A NEG 02/27/2025    GROUPTRH A NEG 02/27/2025    HEPBSAG Non-Reactive 02/27/2025     Recent Labs   Lab 02/28/25 0341   HGB 8.6*   HCT 28.0*       CBC:   Recent Labs   Lab 02/28/25 0341   WBC 12.32*   RBC 3.51*   HGB 8.6*   HCT 28.0*      MCV 79.8*   MCH 24.5*   MCHC 30.7*     I have personallly reviewed all pertinent lab results from the last 24 hours.  Recent Lab Results         02/28/25 0341 02/27/25 2008        Baso # 0.03         Basophil % 0.2         Differential Method Auto         Eos # 0.06         Eos % 0.5         Hematocrit 28.0         Hemoglobin 8.6         Immature Grans (Abs) 0.09         Immature Granulocytes 0.7         Lymph # 2.57         Lymph % 20.9         MCH 24.5         MCHC 30.7         MCV 79.8         Mono # 1.18         Mono % 9.6         MPV 11.2         Neutrophils, Abs 8.39         Neutrophils Relative 68.1         nRBC 0.0          NUCLEATED RBC ABSOLUTE 0.00         Platelet Count 194         RBC 3.51         RDW 13.8         Rh Immune Globulin   [id number] Issued       Edith - FMH (Fetal Bleed Screen)   NEG       WBC 12.32                 Physical Exam:   Constitutional: She is oriented to person, place, and time. She appears well-developed and well-nourished.        Pulmonary/Chest: Effort normal.          Genitourinary:    Genitourinary Comments: Uterus: firm, midline, 2 below umbilicus  Lochia: scant rubra noted  Perineum: lacerations healing well, small amount of edema noted             Musculoskeletal: Moves all extremeties.       Neurological: She is alert and oriented to person, place, and time.    Skin: Skin is warm and dry.    Psychiatric: She has a normal mood and affect. Her behavior is normal. Judgment and thought content normal.       Review of Systems   All other systems reviewed and are negative.

## 2025-02-28 NOTE — DISCHARGE INSTRUCTIONS
Topic Page  Nurse Date Time Comments   PP Education Booklet Given ---- ER 02/28/2025 Discharge booklet given and discussion on topics done.    Skin to skin 18 ER     Rooming in 29 ER     Importance of Exclusive Breastfeeding for 6 mo 35 ER     Bleeding 6 ER     Incision Care 10 ER     Sex 11 ER     Pain Management 8 ER     Perineal Care 9 ER     Minimizing Engorgement 40 ER     Collection & Storage of BM 42-43 ER     S/S of BF Problems  ER     Hand Expression 42 ER     Maternal s/s breast problems 41 ER     Mgnt of Common BF Problems (engorgement, sore & cracked nipples) 40-41 ER     PPD/Anxiety 14-15 ER

## 2025-02-28 NOTE — PROGRESS NOTES
[facility] -  Labor and Delivery  Obstetrics  Postpartum Progress Note    Patient Name: Emilie Petty  MRN: [id number]  Admission Date: 2025  Hospital Length of Stay: 2 days  Attending Physician: Mindi Wells MD  Primary Care Provider: Mala, Primary Doctor    Subjective:     Principal Problem: (spontaneous vaginal delivery)    Hospital Course:  Pt is a  that was scheduled for an induction of labor secondary to IUGR. She is 40 weeks 0 days. She is Rh negative and GBS positive.  She has a h/o anemia in the pregnancy. She is being induced with pitocin. She proceeded to deliver a male  with Apgars 8 and 9 weighing 8 pounds 0.6 ounces experiencing a second degree laceration that was repaired and a right labial laceration that was repaired. She is breast and bottle feeding.        Interval History: PPD 1    She is doing well this morning. She is tolerating a regular diet without nausea or vomiting. She is voiding spontaneously. She is ambulating. She has passed flatus, and has a BM. Vaginal bleeding is mild. She denies fever or chills. Abdominal pain is mild and controlled with oral medications. She Is breastfeeding. She desires circumcision for her male baby: yes.    Objective:     Vital Signs (Most Recent):  Temp: 98.4 °F (36.9 °C) (25 0735)  Pulse: 84 (25 0735)  Resp: 20 (25 0643)  BP: 125/80 (25 0735)  SpO2: 99 % (25 0735) Vital Signs (24h Range):  Temp:  [97.6 °F (36.4 °C)-98.7 °F (37.1 °C)] 98.4 °F (36.9 °C)  Pulse:  [] 84  Resp:  [16-20] 20  SpO2:  [98 %-99 %] 99 %  BP: (110-135)/(56-80) 125/80     Weight: 98.4 kg (217 lb)  Body mass index is 37.25 kg/m².      Intake/Output Summary (Last 24 hours) at 2025 0940  Last data filed at 2025 1500  Gross per 24 hour   Intake --   Output 1100 ml   Net -1100 ml         Significant Labs:  Lab Results   Component Value Date    [facility] A NEG 2025    [facility] A NEG 2025    HEPBSAG  Non-Reactive 2025     Recent Labs   Lab 25  0341   HGB 8.6*   HCT 28.0*       CBC:   Recent Labs   Lab 25  0341   WBC 12.32*   RBC 3.51*   HGB 8.6*   HCT 28.0*      MCV 79.8*   MCH 24.5*   MCHC 30.7*     I have personallly reviewed all pertinent lab results from the last 24 hours.  Recent Lab Results         25  0341   25        Baso # 0.03         Basophil % 0.2         Differential Method Auto         Eos # 0.06         Eos % 0.5         Hematocrit 28.0         Hemoglobin 8.6         Immature Grans (Abs) 0.09         Immature Granulocytes 0.7         Lymph # 2.57         Lymph % 20.9         MCH 24.5         MCHC 30.7         MCV 79.8         Mono # 1.18         Mono % 9.6         MPV 11.2         Neutrophils, Abs 8.39         Neutrophils Relative 68.1         nRBC 0.0         NUCLEATED RBC ABSOLUTE 0.00         Platelet Count 194         RBC 3.51         RDW 13.8         Rh Immune Globulin   [id number] Issued       Edith - FMH (Fetal Bleed Screen)   NEG       WBC 12.32                 Physical Exam:   Constitutional: She is oriented to person, place, and time. She appears well-developed and well-nourished.        Pulmonary/Chest: Effort normal.          Genitourinary:    Genitourinary Comments: Uterus: firm, midline, 2 below umbilicus  Lochia: scant rubra noted  Perineum: lacerations healing well, small amount of edema noted             Musculoskeletal: Moves all extremeties.       Neurological: She is alert and oriented to person, place, and time.    Skin: Skin is warm and dry.    Psychiatric: She has a normal mood and affect. Her behavior is normal. Judgment and thought content normal.       Review of Systems   All other systems reviewed and are negative.    Assessment/Plan:     30 y.o. female  for:    *  (spontaneous vaginal delivery)  Routine postpartum care    Rh negative state in antepartum period  Rhogam as indicated        Disposition: As patient  meets milestones, will plan to discharge tomorrow if stable..    Lucia Hernandez CNM  Obstetrics  [facility] -  Labor and Delivery

## 2025-03-01 VITALS
HEIGHT: 64 IN | TEMPERATURE: 98 F | OXYGEN SATURATION: 98 % | RESPIRATION RATE: 16 BRPM | HEART RATE: 67 BPM | BODY MASS INDEX: 37.05 KG/M2 | DIASTOLIC BLOOD PRESSURE: 74 MMHG | SYSTOLIC BLOOD PRESSURE: 122 MMHG | WEIGHT: 217 LBS

## 2025-03-01 PROCEDURE — 25000003 PHARM REV CODE 250: Performed by: ADVANCED PRACTICE MIDWIFE

## 2025-03-01 RX ADMIN — PRENATAL VITAMINS-IRON FUMARATE 27 MG IRON-FOLIC ACID 0.8 MG TABLET 1 TABLET: at 09:03

## 2025-03-01 RX ADMIN — FERROUS SULFATE TAB 325 MG (65 MG ELEMENTAL FE) 1 EACH: 325 (65 FE) TAB at 09:03

## 2025-03-01 RX ADMIN — HYDROCORTISONE 2.5%: 25 CREAM TOPICAL at 08:03

## 2025-03-01 RX ADMIN — IBUPROFEN 800 MG: 800 TABLET, FILM COATED ORAL at 08:03

## 2025-03-01 RX ADMIN — DOCUSATE SODIUM 200 MG: 100 CAPSULE, LIQUID FILLED ORAL at 09:03

## 2025-03-01 NOTE — PLAN OF CARE
Problem: Adult Inpatient Plan of Care  Goal: Plan of Care Review  Outcome: Met  Goal: Patient-Specific Goal (Individualized)  Outcome: Met  Goal: Absence of Hospital-Acquired Illness or Injury  Outcome: Met  Goal: Optimal Comfort and Wellbeing  Outcome: Met  Goal: Readiness for Transition of Care  Outcome: Met     Problem: Infection  Goal: Absence of Infection Signs and Symptoms  Outcome: Met     Problem:  Fall Injury Risk  Goal: Absence of Fall, Infant Drop and Related Injury  Outcome: Met     Problem: Postpartum (Vaginal Delivery)  Goal: Successful Parent Role Transition  Outcome: Met  Goal: Hemostasis  Outcome: Met  Goal: Absence of Infection Signs and Symptoms  Outcome: Met  Goal: Anesthesia/Sedation Recovery  Outcome: Met  Goal: Optimal Pain Control and Function  Outcome: Met  Goal: Effective Urinary Elimination  Outcome: Met

## 2025-03-01 NOTE — PLAN OF CARE
Problem: Adult Inpatient Plan of Care  Goal: Plan of Care Review  2025 185 by Jocelin Warren RN  Outcome: Progressing  2025 184 by Jocelin Warren RN  Outcome: Progressing  Goal: Patient-Specific Goal (Individualized)  2025 by Jocelin Warren RN  Outcome: Progressing  2025 184 by Jocelin Warren RN  Outcome: Progressing  Goal: Absence of Hospital-Acquired Illness or Injury  2025 185 by Jocelin Warren, RN  Outcome: Progressing  2025 184 by Jocelin Warren RN  Outcome: Progressing  Goal: Optimal Comfort and Wellbeing  2025 by Jocelin Warren RN  Outcome: Progressing  2025 by Jocelin Warren RN  Outcome: Progressing  Goal: Readiness for Transition of Care  2025 by Jocelin Warren, RN  Outcome: Progressing  2025 184 by Jocelin Warren RN  Outcome: Progressing     Problem: Infection  Goal: Absence of Infection Signs and Symptoms  2025 by Jocelin Warren RN  Outcome: Progressing  2025 184 by Jocelin Warren RN  Outcome: Progressing     Problem:  Fall Injury Risk  Goal: Absence of Fall, Infant Drop and Related Injury  2025 by Jocelin Warren, RN  Outcome: Progressing  2025 184 by Jocelin Warren RN  Outcome: Progressing     Problem: Postpartum (Vaginal Delivery)  Goal: Successful Parent Role Transition  2025 185 by Jocelin Warren, RN  Outcome: Progressing  2025 184 by Jocelin Warren RN  Outcome: Progressing  Goal: Hemostasis  2025 185 by Jocelin Warren, RN  Outcome: Progressing  2025 184 by Jocelin Warren RN  Outcome: Progressing  Goal: Absence of Infection Signs and Symptoms  2025 185 by Jocelin Warren, RN  Outcome: Progressing  2025 184 by Jocelin Warren RN  Outcome: Progressing  Goal: Anesthesia/Sedation Recovery  2025 by Jocelin Warren RN  Outcome:  Progressing  2/28/2025 1841 by Jocelin Warren RN  Outcome: Progressing  Goal: Optimal Pain Control and Function  2/28/2025 1856 by Jocelin Warren RN  Outcome: Progressing  2/28/2025 1841 by Jocelin Warren RN  Outcome: Progressing  Goal: Effective Urinary Elimination  2/28/2025 1856 by Jocelin Warren RN  Outcome: Progressing  2/28/2025 1841 by Jocelin Warren RN  Outcome: Progressing

## 2025-03-03 LAB — RUBV IGG SER-ACNC: >40 IU/ML

## 2025-03-10 NOTE — DISCHARGE SUMMARY
"[facility] -  Labor and Delivery  Obstetrics  Discharge Summary      Patient Name: Emilie Petty  MRN: [id number]  Admission Date: 2025  Hospital Length of Stay: 3 days  Discharge Date and Time: 3/1/2025 10:00 AM  Attending Physician: Mala att. providers found   Discharging Provider: Lucia Hernandez CNM  Primary Care Provider: Mala Primary Doctor    HPI: PPD 2    * No surgery found *     Hospital Course:   Pt is a  that was scheduled for an induction of labor secondary to IUGR. She is 40 weeks 0 days. She is Rh negative and GBS positive.  She has a h/o anemia in the pregnancy. She is being induced with pitocin. She proceeded to deliver a male  with Apgars 8 and 9 weighing 8 pounds 0.6 ounces experiencing a second degree laceration that was repaired and a right labial laceration that was repaired. She is breast and bottle feeding              Final Active Diagnoses:    Diagnosis Date Noted POA    PRINCIPAL PROBLEM:   (spontaneous vaginal delivery) [O80] 2025 Not Applicable    Poor fetal growth affecting management of mother in third trimester [O36.5930] 2025 Unknown    Rh negative state in antepartum period [O26.899, Z67.91] 10/31/2024 Yes      Problems Resolved During this Admission:        N/A    Feeding Method: both breast and bottle    Immunizations       Date Immunization Status Dose Route/Site Given by    25 1302 Tdap Deleted 0.5 mL Intramuscular/     25 1854 Rho (D) Immune Globulin - IM Deleted       25 1854 Rho (D) Immune Globulin - IM Deleted               Delivery:    Episiotomy: None   Lacerations: 2nd   Repair suture:     Repair # of packets: 2   Blood loss (ml):       Birth information:  YOB: 2027   Time of birth: 8:26 AM   Sex: male   Delivery type: Vaginal, Spontaneous   Gestational Age: 40w0d     Measurements    Weight: 3647 g  Weight (lbs): 8 lb 0.6 oz  Length: 53.3 cm  Length (in): 21"  Head circumference: 33 cm  Chest " circumference: 32 cm  Abdominal girth: 35.5 cm         Delivery Clinician: Delivery Providers    Delivering clinician: Lucia Hernandez CNM   Provider Role    Madeleine Wyman, RN Delivery Nurse    Teresita Pantoja, [facility]    Matt Yee, [facility]    Baylee Gay, RN NICU             Additional  information:  Forceps:    Vacuum:    Breech:    Observed anomalies      Living?:     Apgars    Living status: Living  Apgar Component Scores:  1 min.:  5 min.:  10 min.:  15 min.:  20 min.:    Skin color:  1  1       Heart rate:  2  2       Reflex irritability:  2  2       Muscle tone:  2  2       Respiratory effort:  2  2       Total:  9  9       Apgars assigned by: PEGGY GAY RN NICU         Placenta: Delivered:       appearance  Pending Diagnostic Studies:       None            Discharged Condition: good    Disposition: Home or Self Care    Follow Up:   Follow-up Information       Lucia Hernandez CNM Follow up in 2 week(s).    Specialty: Obstetrics and Gynecology  Why: Postpartum Visit  Contact information:  [address] [place] [address]  [phone]                           Patient Instructions:      Diet Adult Regular     No driving until:   Order Comments: No driving for 2 weeks     Notify your health care provider if you experience any of the following:  temperature >100.4     Notify your health care provider if you experience any of the following:  persistent nausea and vomiting or diarrhea     Notify your health care provider if you experience any of the following:  severe uncontrolled pain     Notify your health care provider if you experience any of the following:  difficulty breathing or increased cough     Notify your health care provider if you experience any of the following:  severe persistent headache     Notify your health care provider if you experience any of the following:  worsening rash     Notify your health care provider if you experience any of the following:   persistent dizziness, light-headedness, or visual disturbances     Notify your health care provider if you experience any of the following:  increased confusion or weakness     Activity as tolerated     Medications:  Discharge Medication List as of 3/1/2025  8:47 AM        START taking these medications    Details   ibuprofen (ADVIL,MOTRIN) 800 MG tablet Take 1 tablet (800 mg total) by mouth every 8 (eight) hours as needed for Pain... take with food, Starting Fri 2/28/2025, Normal           CONTINUE these medications which have NOT CHANGED    Details   ferrous sulfate 325 (65 FE) MG EC tablet Take 1 tablet (325 mg total) by mouth 2 (two) times daily., Starting Thu 1/30/2025, Normal      PNV no.95/ferrous fum/folic ac (PRENATAL ORAL) Take by mouth., Historical Med           STOP taking these medications       aspirin (ECOTRIN) 81 MG EC tablet Comments:   Reason for Stopping:               Lucia Hernandez CNM  Obstetrics  [facility] -  Labor and Delivery

## 2025-03-20 ENCOUNTER — PATIENT MESSAGE (OUTPATIENT)
Dept: OBSTETRICS AND GYNECOLOGY | Facility: CLINIC | Age: 30
End: 2025-03-20
Payer: COMMERCIAL

## 2025-03-20 ENCOUNTER — OFFICE VISIT (OUTPATIENT)
Dept: OBSTETRICS AND GYNECOLOGY | Facility: CLINIC | Age: 30
End: 2025-03-20
Payer: COMMERCIAL

## 2025-03-20 VITALS
BODY MASS INDEX: 34.04 KG/M2 | HEIGHT: 64 IN | TEMPERATURE: 98 F | HEART RATE: 59 BPM | WEIGHT: 199.38 LBS | SYSTOLIC BLOOD PRESSURE: 130 MMHG | OXYGEN SATURATION: 98 % | RESPIRATION RATE: 16 BRPM | DIASTOLIC BLOOD PRESSURE: 86 MMHG

## 2025-03-20 NOTE — PROGRESS NOTES
"CC: Post-partum follow-up    Emilie Petty is a 30 y.o. female  who presents for post-partum visit.  She is S/P a .  She and the baby are both doing well.  No pain.  No fever.   No bowel / bladder complaints.    Delivery Date: 2025  Delivery MD: Lucia Hernandez  Gender: female  Birth Weight: 8 pounds 1 ounces  Breast Feeding: NO  Depression: YES  Contraception: oral contraceptives (estrogen/progesterone)    Pregnancy was complicated by:  None    /86   Pulse (!) 59   Temp 97.5 °F (36.4 °C) (Oral)   Resp 16   Ht 5' 4" (1.626 m)   Wt 90.4 kg (199 lb 6.4 oz)   LMP 2024 (Exact Date)   SpO2 98%   Breastfeeding No   BMI 34.23 kg/m²     ROS:  GENERAL: No fever, chills, fatigability.  VULVAR: No pain, no lesions and no itching.  VAGINAL: No relaxation, no itching, no discharge, no abnormal bleeding and no lesions.  ABDOMEN: No abdominal pain. Denies nausea. Denies vomiting. No diarrhea. No constipation  BREAST: Denies pain. No lumps. No discharge.  URINARY: No incontinence, no nocturia, no frequency and no dysuria.  CARDIOVASCULAR: No chest pain. No shortness of breath. No leg cramps.  NEUROLOGICAL: No headaches. No vision changes.    PHYSICAL EXAM:  ABDOMEN:  Soft, non-tender, non-distended  VULVA:  Normal, no lesions  CERVIX:  deferred  UTERUS:  Normal size, shape, consistency, no mass or tenderness.  ADNEXA:  deferred    IMP:  3 Weeks Postpartum  Status Post   Encounter for postpartum visit        ICD-10-CM ICD-9-CM    1. Encounter for postpartum visit  Z39.2 V24.2           PLAN:  May resume normal activities after 6 weeks postpartum  May be released to return to work 6 weeks postpartum  Birth control options and counseling discussed  Verbalized understanding to all information and instructions  Questions answered to desired level of satisfaction    Follow up in about 1 week (around 3/27/2025), or if symptoms worsen or fail to improve, for postpartum visit/exam.     "

## 2025-03-27 ENCOUNTER — POSTPARTUM VISIT (OUTPATIENT)
Dept: OBSTETRICS AND GYNECOLOGY | Facility: CLINIC | Age: 30
End: 2025-03-27
Payer: COMMERCIAL

## 2025-03-27 VITALS
HEIGHT: 64 IN | SYSTOLIC BLOOD PRESSURE: 128 MMHG | WEIGHT: 205.38 LBS | TEMPERATURE: 98 F | OXYGEN SATURATION: 98 % | BODY MASS INDEX: 35.06 KG/M2 | RESPIRATION RATE: 18 BRPM | HEART RATE: 67 BPM | DIASTOLIC BLOOD PRESSURE: 78 MMHG

## 2025-03-27 DIAGNOSIS — Z30.011 ENCOUNTER FOR INITIAL PRESCRIPTION OF CONTRACEPTIVE PILLS: ICD-10-CM

## 2025-03-27 RX ORDER — LEVONORGESTREL/ETHIN.ESTRADIOL 0.1-0.02MG
1 TABLET ORAL DAILY
Qty: 28 TABLET | Refills: 4 | Status: SHIPPED | OUTPATIENT
Start: 2025-03-27 | End: 2026-03-27

## 2025-03-27 NOTE — LETTER
March 27, 2025    Emilie Petty  [address] [place] [address]         [facility] - OB/GYN  [address] [place] [address] [place] [address]  Phone: [phone]  Fax: [phone] March 27, 2025     Patient: Emilie Petty   YOB: 1995   Date of Visit: 3/27/2025       To Whom It May Concern:    It is my medical opinion that Emilie Petty may return to full duty immediately with no restrictions on 4/15/2025.    If you have any questions or concerns, please don't hesitate to call.    Sincerely,        Lucia Hernandez, CARMELOM

## 2025-03-27 NOTE — PROGRESS NOTES
"CC: Post-partum follow-up    Emilie Petty is a 30 y.o. female  who presents for post-partum visit.  She is S/P a .  She and the baby are both doing well.  No pain.  No fever.   No bowel / bladder complaints.    Delivery Date: 2025  Delivery MD: Lucia Hernandez  Gender: male  Birth Weight: 8 pounds 0 ounces  Breast Feeding: NO  Depression: NO  Contraception: oral contraceptives (estrogen/progesterone)    Pregnancy was complicated by:  None    /78   Pulse 67   Temp 98 °F (36.7 °C) (Oral)   Resp 18   Ht 5' 4" (1.626 m)   Wt 93.2 kg (205 lb 6.4 oz)   LMP 2024 (Exact Date)   SpO2 98%   Breastfeeding No   BMI 35.26 kg/m²     ROS:  GENERAL: No fever, chills, fatigability.  VULVAR: No pain, no lesions and no itching.  VAGINAL: No relaxation, no itching, no discharge, no abnormal bleeding and no lesions.  ABDOMEN: No abdominal pain. Denies nausea. Denies vomiting. No diarrhea. No constipation  BREAST: Denies pain. No lumps. No discharge.  URINARY: No incontinence, no nocturia, no frequency and no dysuria.  CARDIOVASCULAR: No chest pain. No shortness of breath. No leg cramps.  NEUROLOGICAL: No headaches. No vision changes.    PHYSICAL EXAM:  ABDOMEN:  Soft, non-tender, non-distended  VULVA:  Normal, no lesions  CERVIX:  Without lesions, polyps or tenderness.  UTERUS:  Normal size, shape, consistency, no mass or tenderness.  ADNEXA:  Normal in size without mass or tenderness    IMP:  4 Weeks Postpartum  Status Post   Postpartum exam    Encounter for initial prescription of contraceptive pills  -     levonorgestrel-ethinyl estradiol 0.1-20 mg-mcg per tablet; Take 1 tablet by mouth once daily.  Dispense: 28 tablet; Refill: 4        ICD-10-CM ICD-9-CM    1. Postpartum exam  Z39.2 V24.2       2. Encounter for initial prescription of contraceptive pills  Z30.011 V25.01 levonorgestrel-ethinyl estradiol 0.1-20 mg-mcg per tablet          PLAN:  May resume normal activities after 6 weeks " postpartum/post  section  May be released to return to work 6 weeks postpartum/post  section  Discussed ACHES (abdominal pain, chest pain, headaches, epigastric pain, stroke s/s or embolis/blood clot s/s) with oral contraceptives/Xulane or Ortho Evra Patch/NuvaRing use, if noted, F/U @ ER  or clinic for evaluation  Use back up method for first month's use of oral contraceptives/Xulane or Ortho Evra Patch/NuvaRing, if on antibiotics, or if not taking pills correctly  Discussed oral contraceptives/Xulane or Ortho Evra Patch/NuvaRing, does not protect against STIs/STDs  The use of the oral contraceptive has been fully discussed with the patient. This includes the proper method to initiate and continue the pills, the need for regular compliance to ensure adequate contraceptive effect, the physiology which make the pill effective, the instructions for what to do in event of a missed pill, and warnings about anticipated minor side effects such as breakthrough spotting, nausea, breast tenderness, weight changes, acne, headaches, etc.  She has been told of the more serious potential side effects such as MI, stroke, and deep vein thrombosis, all of which are very unlikely.  She has been asked to report any signs of such serious problems immediately.  She should back up the pill with a condom during any cycle in which antibiotics are prescribed, and during the first cycle as well. The need for additional protection, such as a condom, to prevent exposure to sexually transmitted diseases has also been discussed- the patient has been clearly reminded that OCP's cannot protect her against diseases such as HIV and others. She understands and wishes to take the medication as prescribed.  Birth control options and counseling discussed  Verbalized understanding to all information and instructions  Questions answered to desired level of satisfaction    Follow up if symptoms worsen or fail to improve, for f/u as  scheduled to f/u for OCPs and annual exam in August 2025.

## 2025-03-27 NOTE — PROCEDURES
OB ultrasound Note:    BPD- 36 weeks 4 day  HC-36 weeks 1 day  AC- 38 weeks 4 day  FL- 39 weeks 3 day    LUBA- 8.16 cm    Fetal heart rate:  154 bpm    Fetal position- vertex  Placental location- posterior    Impression-    SERGIO March 13, 2025  Estimated gestational age 37 weeks 5 day  EFW 3432 g (7 lb 9 oz)  Pctl ( EFW) 69 th percentile      Biophysical Profile:    Fetal Movements- 2  Fetal breathing- 2  Fetal Tone- 2  Amniotic Fluid Volume- 2    Total - 8

## 2025-08-07 ENCOUNTER — OFFICE VISIT (OUTPATIENT)
Dept: OBSTETRICS AND GYNECOLOGY | Facility: CLINIC | Age: 30
End: 2025-08-07
Payer: COMMERCIAL

## 2025-08-07 VITALS
DIASTOLIC BLOOD PRESSURE: 85 MMHG | OXYGEN SATURATION: 99 % | SYSTOLIC BLOOD PRESSURE: 134 MMHG | HEART RATE: 68 BPM | HEIGHT: 64 IN | BODY MASS INDEX: 34.72 KG/M2 | WEIGHT: 203.38 LBS | RESPIRATION RATE: 16 BRPM

## 2025-08-07 DIAGNOSIS — Z01.419 ENCOUNTER FOR WELL WOMAN EXAM WITH ROUTINE GYNECOLOGICAL EXAM: Primary | ICD-10-CM

## 2025-08-07 DIAGNOSIS — Z30.9 ENCOUNTER FOR CONTRACEPTIVE MANAGEMENT, UNSPECIFIED TYPE: ICD-10-CM

## 2025-08-07 DIAGNOSIS — R21 SKIN RASH: ICD-10-CM

## 2025-08-07 DIAGNOSIS — Z30.013 ENCOUNTER FOR INITIAL PRESCRIPTION OF INJECTABLE CONTRACEPTIVE: ICD-10-CM

## 2025-08-07 DIAGNOSIS — Z11.3 SCREEN FOR SEXUALLY TRANSMITTED DISEASES: ICD-10-CM

## 2025-08-07 LAB
B-HCG UR QL: NEGATIVE
BACTERIAL VAGINOSIS DNA (OHS): POSITIVE
CANDIDA GLABRATA/KRUSEI DNA (OHS): NOT DETECTED
CANDIDA SPECIES DNA (OHS): NOT DETECTED
CTP QC/QA: YES
HAV IGM SER QL: NORMAL
HBV CORE IGM SER QL: NORMAL
HBV SURFACE AG SERPL QL IA: NORMAL
HCV AB SER QL: NORMAL
HIV 1+O+2 AB SERPL QL: NORMAL
SYPHILIS AB INTERPRETATION: NORMAL
TRICHOMONAS VAGINALIS DNA (OHS): NOT DETECTED

## 2025-08-07 PROCEDURE — 87389 HIV-1 AG W/HIV-1&-2 AB AG IA: CPT | Mod: ,,, | Performed by: CLINICAL MEDICAL LABORATORY

## 2025-08-07 PROCEDURE — 87626 HPV SEP HI-RSK TYP&POOL RSLT: CPT | Mod: ,,, | Performed by: CLINICAL MEDICAL LABORATORY

## 2025-08-07 PROCEDURE — 80074 ACUTE HEPATITIS PANEL: CPT | Mod: ,,, | Performed by: CLINICAL MEDICAL LABORATORY

## 2025-08-07 PROCEDURE — 87491 CHLMYD TRACH DNA AMP PROBE: CPT | Mod: ,,, | Performed by: CLINICAL MEDICAL LABORATORY

## 2025-08-07 PROCEDURE — 81515 NFCT DS BV&VAGINITIS DNA ALG: CPT | Mod: QW,,, | Performed by: CLINICAL MEDICAL LABORATORY

## 2025-08-07 PROCEDURE — 88142 CYTOPATH C/V THIN LAYER: CPT | Mod: TC,GCY | Performed by: ADVANCED PRACTICE MIDWIFE

## 2025-08-07 PROCEDURE — 86780 TREPONEMA PALLIDUM: CPT | Mod: ,,, | Performed by: CLINICAL MEDICAL LABORATORY

## 2025-08-07 PROCEDURE — 87591 N.GONORRHOEAE DNA AMP PROB: CPT | Mod: ,,, | Performed by: CLINICAL MEDICAL LABORATORY

## 2025-08-07 RX ORDER — MOMETASONE FUROATE 1 MG/G
CREAM TOPICAL DAILY
Qty: 45 G | Refills: 3 | Status: SHIPPED | OUTPATIENT
Start: 2025-08-07

## 2025-08-07 RX ORDER — MEDROXYPROGESTERONE ACETATE 150 MG/ML
150 INJECTION, SUSPENSION INTRAMUSCULAR
Status: COMPLETED | OUTPATIENT
Start: 2025-08-07 | End: 2025-08-07

## 2025-08-07 RX ADMIN — MEDROXYPROGESTERONE ACETATE 150 MG: 150 INJECTION, SUSPENSION INTRAMUSCULAR at 12:08

## 2025-08-07 NOTE — PROGRESS NOTES
"CC: Here for pap smear, annual exam    Emilie Petty is a 30 y.o. female  presents for well woman exam.  LMP: Patient's last menstrual period was 2025 (exact date).. Menses are: monthly, lasts 5-7 days, medium flow, no clots. States she stopped birth control method and would like to start depo provera. States had unprotected intercourse 3 days ago and partner did not use a condom. She c/o skin rash to inner thigh area. Denies any further issues, problems, or complaints. She requests STD testing today.    Last mammogram: n/a  Colonoscopy: n/a    History reviewed. No pertinent past medical history.  Past Surgical History:   Procedure Laterality Date    MOUTH SURGERY       Social History[1]  Family History   Problem Relation Name Age of Onset    Diabetes Maternal Grandmother      Hypertension Father      Ovarian cancer Mother      Ovarian cysts Mother       OB History          2    Para   2    Term   2            AB        Living   2         SAB        IAB        Ectopic        Multiple   0    Live Births   2                 /85   Pulse 68   Resp 16   Ht 5' 4" (1.626 m)   Wt 92.3 kg (203 lb 6.4 oz)   LMP 2025 (Exact Date)   SpO2 99%   Breastfeeding No   BMI 34.91 kg/m²       ROS:  GENERAL: Denies weight gain or weight loss. Feeling well overall.   SKIN: Denies rash or lesions.   HEAD: Denies head injury or headache.   NODES: Denies enlarged lymph nodes.   CHEST: Denies chest pain or shortness of breath.   CARDIOVASCULAR: Denies palpitations or left sided chest pain.   ABDOMEN: No abdominal pain, constipation, diarrhea, nausea, vomiting or rectal bleeding.   URINARY: No frequency, dysuria, hematuria, or burning on urination.  REPRODUCTIVE: See HPI.   BREASTS: The patient performs breast self-examination and denies pain, lumps, or nipple discharge.   HEMATOLOGIC: No easy bruisability or excessive bleeding.   MUSCULOSKELETAL: Denies joint pain or swelling.   NEUROLOGIC: " Denies syncope or weakness.   PSYCHIATRIC: Denies depression, anxiety or mood swings.    PHYSICAL EXAM:  APPEARANCE: Well nourished, well developed, in no acute distress.  AFFECT: WNL, alert and oriented x 3  SKIN: No acne or hirsutism  NECK: Neck symmetric without masses or thyromegaly  NODES: No inguinal, cervical, axillary, or femoral lymph node enlargement  CHEST: Good respiratory effect  ABDOMEN: Soft.  No tenderness or masses.  No hepatosplenomegaly.  No hernias.  BREASTS: Symmetrical, no skin changes or visible lesions.  No palpable masses, nipple discharge bilaterally.  PELVIC: Normal external genitalia without lesions.  Normal hair distribution.  Adequate perineal body, normal urethral meatus.  Vagina moist and well rugated without lesions, with small whirte discharge.  Cervix pink, without lesions, tenderness with small white discharge.  No significant cystocele or rectocele.  Bimanual exam shows uterus to be normal size, regular, mobile and nontender.  Adnexa without masses or tenderness.  Chaperone present for exam  Skin: flat, discolored rash noted to inguinal areas bilaterally without any itching or discharge  EXTREMITIES: No edema.    Encounter for well woman exam with routine gynecological exam  -     ThinPrep Pap Test; Future; Expected date: 08/07/2025    Encounter for contraceptive management, unspecified type  -     POCT urine pregnancy    Skin rash  -     mometasone 0.1% (ELOCON) 0.1 % cream; Apply topically once daily.  Dispense: 45 g; Refill: 3    Screen for sexually transmitted diseases  -     Vaginosis Screen by DNA Probe  -     HIV 1/2 Ag/Ab (4th Gen); Future; Expected date: 08/07/2025  -     Chlamydia/GC, PCR  -     Treponema Pallidum (Syphillis) Antibody; Future; Expected date: 08/07/2025  -     Hepatitis Panel, Acute; Future; Expected date: 08/07/2025    BMI 34.0-34.9,adult    Encounter for initial prescription of injectable contraceptive  -     medroxyPROGESTERone (DEPO-PROVERA)  injection 150 mg        ICD-10-CM ICD-9-CM    1. Encounter for well woman exam with routine gynecological exam  Z01.419 V72.31 ThinPrep Pap Test      ThinPrep Pap Test      2. Encounter for contraceptive management, unspecified type  Z30.9 V25.9 POCT urine pregnancy      3. Skin rash  R21 782.1 mometasone 0.1% (ELOCON) 0.1 % cream      4. Screen for sexually transmitted diseases  Z11.3 V74.5 Vaginosis Screen by DNA Probe      HIV 1/2 Ag/Ab (4th Gen)      Chlamydia/GC, PCR      Treponema Pallidum (Syphillis) Antibody      Hepatitis Panel, Acute      5. BMI 34.0-34.9,adult  Z68.34 V85.34       6. Encounter for initial prescription of injectable contraceptive  Z30.013 V25.02 medroxyPROGESTERone (DEPO-PROVERA) injection 150 mg          Patient was counseled today on A.C.S. Pap guidelines and recommendations for yearly pelvic exams, mammograms and monthly self breast exams; to see her PCP for other health maintenance.   Exercise and weight loss regimen encouraged  Healthy food choices encouraged  Multivitamins daily  Vitamin D daily  Questions answered to desired level of satisfaction  Verbalized understanding to all information and instructions    Follow up in about 1 year (around 8/7/2026), or if symptoms worsen or fail to improve, for annual exam in 1 yr and f/u in 12 weeks for repeat depo provera.                         [1]   Social History  Socioeconomic History    Marital status: Single   Tobacco Use    Smoking status: Never     Passive exposure: Never    Smokeless tobacco: Never   Substance and Sexual Activity    Alcohol use: Never    Drug use: Never    Sexual activity: Yes     Partners: Male

## 2025-08-08 LAB
CHLAMYDIA BY PCR: NEGATIVE
N. GONORRHOEAE (GC) BY PCR: NEGATIVE